# Patient Record
Sex: MALE | Race: BLACK OR AFRICAN AMERICAN | NOT HISPANIC OR LATINO | Employment: OTHER | URBAN - METROPOLITAN AREA
[De-identification: names, ages, dates, MRNs, and addresses within clinical notes are randomized per-mention and may not be internally consistent; named-entity substitution may affect disease eponyms.]

---

## 2024-02-25 ENCOUNTER — HOSPITAL ENCOUNTER (INPATIENT)
Facility: CLINIC | Age: 64
LOS: 2 days | Discharge: NURSING, REHAB OR LONG-TERM CARE | DRG: 066 | End: 2024-02-28
Attending: EMERGENCY MEDICINE | Admitting: HOSPITALIST
Payer: COMMERCIAL

## 2024-02-25 ENCOUNTER — APPOINTMENT (OUTPATIENT)
Dept: CT IMAGING | Facility: CLINIC | Age: 64
DRG: 066 | End: 2024-02-25
Attending: EMERGENCY MEDICINE
Payer: COMMERCIAL

## 2024-02-25 DIAGNOSIS — I10 HYPERTENSION, UNSPECIFIED TYPE: Primary | ICD-10-CM

## 2024-02-25 DIAGNOSIS — R06.83 SNORING: ICD-10-CM

## 2024-02-25 DIAGNOSIS — I63.9 CEREBROVASCULAR ACCIDENT (CVA), UNSPECIFIED MECHANISM (H): ICD-10-CM

## 2024-02-25 DIAGNOSIS — E11.9 TYPE 2 DIABETES MELLITUS WITHOUT COMPLICATION, UNSPECIFIED WHETHER LONG TERM INSULIN USE (H): ICD-10-CM

## 2024-02-25 DIAGNOSIS — I67.1 CEREBRAL ANEURYSM, NONRUPTURED: ICD-10-CM

## 2024-02-25 DIAGNOSIS — K59.00 CONSTIPATION, UNSPECIFIED CONSTIPATION TYPE: ICD-10-CM

## 2024-02-25 DIAGNOSIS — R29.90 STROKE-LIKE SYMPTOMS: ICD-10-CM

## 2024-02-25 PROBLEM — D69.6 THROMBOCYTOPENIA (H): Status: ACTIVE | Noted: 2024-02-25

## 2024-02-25 PROBLEM — I67.2 INTRACRANIAL ATHEROSCLEROSIS: Status: ACTIVE | Noted: 2024-02-25

## 2024-02-25 PROBLEM — D64.9 NORMOCYTIC ANEMIA: Status: ACTIVE | Noted: 2024-02-25

## 2024-02-25 PROBLEM — Z79.4 INSULIN DEPENDENT TYPE 2 DIABETES MELLITUS (H): Status: ACTIVE | Noted: 2024-02-25

## 2024-02-25 LAB
ANION GAP SERPL CALCULATED.3IONS-SCNC: 8 MMOL/L (ref 7–15)
APTT PPP: 23 SECONDS (ref 22–38)
ATRIAL RATE - MUSE: 88 BPM
BASOPHILS # BLD AUTO: 0 10E3/UL (ref 0–0.2)
BASOPHILS NFR BLD AUTO: 0 %
BUN SERPL-MCNC: 10.6 MG/DL (ref 8–23)
CALCIUM SERPL-MCNC: 10 MG/DL (ref 8.8–10.2)
CHLORIDE SERPL-SCNC: 105 MMOL/L (ref 98–107)
CHOLEST SERPL-MCNC: 198 MG/DL
CREAT SERPL-MCNC: 1.13 MG/DL (ref 0.67–1.17)
DEPRECATED HCO3 PLAS-SCNC: 25 MMOL/L (ref 22–29)
DIASTOLIC BLOOD PRESSURE - MUSE: 93 MMHG
EGFRCR SERPLBLD CKD-EPI 2021: 73 ML/MIN/1.73M2
EOSINOPHIL # BLD AUTO: 0.1 10E3/UL (ref 0–0.7)
EOSINOPHIL NFR BLD AUTO: 2 %
ERYTHROCYTE [DISTWIDTH] IN BLOOD BY AUTOMATED COUNT: 14 % (ref 10–15)
GLUCOSE BLDC GLUCOMTR-MCNC: 159 MG/DL (ref 70–99)
GLUCOSE BLDC GLUCOMTR-MCNC: 88 MG/DL (ref 70–99)
GLUCOSE SERPL-MCNC: 112 MG/DL (ref 70–99)
HBA1C MFR BLD: 7 %
HCT VFR BLD AUTO: 34.2 % (ref 40–53)
HDLC SERPL-MCNC: 55 MG/DL
HGB BLD-MCNC: 11.2 G/DL (ref 13.3–17.7)
IMM GRANULOCYTES # BLD: 0 10E3/UL
IMM GRANULOCYTES NFR BLD: 1 %
INR PPP: 1.08 (ref 0.85–1.15)
INTERPRETATION ECG - MUSE: NORMAL
LDLC SERPL CALC-MCNC: 124 MG/DL
LYMPHOCYTES # BLD AUTO: 1.2 10E3/UL (ref 0.8–5.3)
LYMPHOCYTES NFR BLD AUTO: 30 %
MCH RBC QN AUTO: 28.1 PG (ref 26.5–33)
MCHC RBC AUTO-ENTMCNC: 32.7 G/DL (ref 31.5–36.5)
MCV RBC AUTO: 86 FL (ref 78–100)
MONOCYTES # BLD AUTO: 0.6 10E3/UL (ref 0–1.3)
MONOCYTES NFR BLD AUTO: 14 %
NEUTROPHILS # BLD AUTO: 2.1 10E3/UL (ref 1.6–8.3)
NEUTROPHILS NFR BLD AUTO: 53 %
NONHDLC SERPL-MCNC: 143 MG/DL
NRBC # BLD AUTO: 0 10E3/UL
NRBC BLD AUTO-RTO: 0 /100
P AXIS - MUSE: 61 DEGREES
PLATELET # BLD AUTO: 144 10E3/UL (ref 150–450)
POTASSIUM SERPL-SCNC: 4.3 MMOL/L (ref 3.4–5.3)
PR INTERVAL - MUSE: 170 MS
QRS DURATION - MUSE: 86 MS
QT - MUSE: 372 MS
QTC - MUSE: 450 MS
R AXIS - MUSE: 10 DEGREES
RBC # BLD AUTO: 3.98 10E6/UL (ref 4.4–5.9)
SODIUM SERPL-SCNC: 138 MMOL/L (ref 135–145)
SYSTOLIC BLOOD PRESSURE - MUSE: 196 MMHG
T AXIS - MUSE: 34 DEGREES
TRIGL SERPL-MCNC: 97 MG/DL
TROPONIN T SERPL HS-MCNC: 11 NG/L
VENTRICULAR RATE- MUSE: 88 BPM
WBC # BLD AUTO: 4 10E3/UL (ref 4–11)

## 2024-02-25 PROCEDURE — 84484 ASSAY OF TROPONIN QUANT: CPT | Performed by: EMERGENCY MEDICINE

## 2024-02-25 PROCEDURE — 82962 GLUCOSE BLOOD TEST: CPT

## 2024-02-25 PROCEDURE — 250N000009 HC RX 250: Performed by: HOSPITALIST

## 2024-02-25 PROCEDURE — 70496 CT ANGIOGRAPHY HEAD: CPT | Mod: MG

## 2024-02-25 PROCEDURE — 80048 BASIC METABOLIC PNL TOTAL CA: CPT | Performed by: EMERGENCY MEDICINE

## 2024-02-25 PROCEDURE — G0378 HOSPITAL OBSERVATION PER HR: HCPCS

## 2024-02-25 PROCEDURE — 250N000013 HC RX MED GY IP 250 OP 250 PS 637: Performed by: EMERGENCY MEDICINE

## 2024-02-25 PROCEDURE — 93005 ELECTROCARDIOGRAM TRACING: CPT | Performed by: EMERGENCY MEDICINE

## 2024-02-25 PROCEDURE — 85610 PROTHROMBIN TIME: CPT | Performed by: EMERGENCY MEDICINE

## 2024-02-25 PROCEDURE — 250N000013 HC RX MED GY IP 250 OP 250 PS 637: Performed by: HOSPITALIST

## 2024-02-25 PROCEDURE — 99207 PR NO CHARGE LOS: CPT | Performed by: PHYSICIAN ASSISTANT

## 2024-02-25 PROCEDURE — 85730 THROMBOPLASTIN TIME PARTIAL: CPT | Performed by: EMERGENCY MEDICINE

## 2024-02-25 PROCEDURE — 250N000011 HC RX IP 250 OP 636: Performed by: EMERGENCY MEDICINE

## 2024-02-25 PROCEDURE — 99285 EMERGENCY DEPT VISIT HI MDM: CPT | Mod: 25

## 2024-02-25 PROCEDURE — 83036 HEMOGLOBIN GLYCOSYLATED A1C: CPT | Performed by: HOSPITALIST

## 2024-02-25 PROCEDURE — 99223 1ST HOSP IP/OBS HIGH 75: CPT | Performed by: HOSPITALIST

## 2024-02-25 PROCEDURE — 80061 LIPID PANEL: CPT | Performed by: HOSPITALIST

## 2024-02-25 PROCEDURE — 36415 COLL VENOUS BLD VENIPUNCTURE: CPT | Performed by: EMERGENCY MEDICINE

## 2024-02-25 PROCEDURE — 250N000012 HC RX MED GY IP 250 OP 636 PS 637: Performed by: HOSPITALIST

## 2024-02-25 PROCEDURE — 85025 COMPLETE CBC W/AUTO DIFF WBC: CPT | Performed by: EMERGENCY MEDICINE

## 2024-02-25 PROCEDURE — 70450 CT HEAD/BRAIN W/O DYE: CPT | Mod: MG

## 2024-02-25 RX ORDER — NETARSUDIL 0.2 MG/ML
1 SOLUTION/ DROPS OPHTHALMIC; TOPICAL AT BEDTIME
COMMUNITY

## 2024-02-25 RX ORDER — CLOPIDOGREL BISULFATE 75 MG/1
300 TABLET ORAL ONCE
Status: COMPLETED | OUTPATIENT
Start: 2024-02-25 | End: 2024-02-25

## 2024-02-25 RX ORDER — TRAVOPROST OPHTHALMIC SOLUTION 0.04 MG/ML
1 SOLUTION OPHTHALMIC AT BEDTIME
COMMUNITY

## 2024-02-25 RX ORDER — NICOTINE POLACRILEX 4 MG
15-30 LOZENGE BUCCAL
Status: DISCONTINUED | OUTPATIENT
Start: 2024-02-25 | End: 2024-02-28 | Stop reason: HOSPADM

## 2024-02-25 RX ORDER — BRIMONIDINE TARTRATE AND TIMOLOL MALEATE 2; 5 MG/ML; MG/ML
1 SOLUTION OPHTHALMIC 2 TIMES DAILY
Status: DISCONTINUED | OUTPATIENT
Start: 2024-02-25 | End: 2024-02-28 | Stop reason: HOSPADM

## 2024-02-25 RX ORDER — DORZOLAMIDE HCL 20 MG/ML
1 SOLUTION/ DROPS OPHTHALMIC 2 TIMES DAILY
Status: DISCONTINUED | OUTPATIENT
Start: 2024-02-25 | End: 2024-02-25

## 2024-02-25 RX ORDER — SIMVASTATIN 10 MG
10 TABLET ORAL AT BEDTIME
Status: ON HOLD | COMMUNITY
End: 2024-02-27

## 2024-02-25 RX ORDER — IOPAMIDOL 755 MG/ML
100 INJECTION, SOLUTION INTRAVASCULAR ONCE
Status: COMPLETED | OUTPATIENT
Start: 2024-02-25 | End: 2024-02-25

## 2024-02-25 RX ORDER — LABETALOL HYDROCHLORIDE 5 MG/ML
10-20 INJECTION, SOLUTION INTRAVENOUS EVERY 10 MIN PRN
Status: DISCONTINUED | OUTPATIENT
Start: 2024-02-25 | End: 2024-02-28 | Stop reason: HOSPADM

## 2024-02-25 RX ORDER — DORZOLAMIDE HCL 20 MG/ML
1 SOLUTION/ DROPS OPHTHALMIC 2 TIMES DAILY
Status: DISCONTINUED | OUTPATIENT
Start: 2024-02-25 | End: 2024-02-28 | Stop reason: HOSPADM

## 2024-02-25 RX ORDER — CLOPIDOGREL BISULFATE 75 MG/1
75 TABLET ORAL DAILY
Status: DISCONTINUED | OUTPATIENT
Start: 2024-02-26 | End: 2024-02-28 | Stop reason: HOSPADM

## 2024-02-25 RX ORDER — SIMVASTATIN 10 MG
10 TABLET ORAL AT BEDTIME
Status: DISCONTINUED | OUTPATIENT
Start: 2024-02-25 | End: 2024-02-26

## 2024-02-25 RX ORDER — DEXTROSE MONOHYDRATE 25 G/50ML
25-50 INJECTION, SOLUTION INTRAVENOUS
Status: DISCONTINUED | OUTPATIENT
Start: 2024-02-25 | End: 2024-02-28 | Stop reason: HOSPADM

## 2024-02-25 RX ORDER — INSULIN ASPART 100 [IU]/ML
INJECTION, SOLUTION INTRAVENOUS; SUBCUTANEOUS
Status: ON HOLD | COMMUNITY
End: 2024-02-28

## 2024-02-25 RX ORDER — BRIMONIDINE TARTRATE AND TIMOLOL MALEATE 2; 5 MG/ML; MG/ML
1 SOLUTION OPHTHALMIC 2 TIMES DAILY
COMMUNITY

## 2024-02-25 RX ORDER — ASPIRIN 325 MG
325 TABLET ORAL DAILY
Status: DISCONTINUED | OUTPATIENT
Start: 2024-02-25 | End: 2024-02-28 | Stop reason: HOSPADM

## 2024-02-25 RX ORDER — DORZOLAMIDE HCL 20 MG/ML
1 SOLUTION/ DROPS OPHTHALMIC 2 TIMES DAILY
COMMUNITY

## 2024-02-25 RX ORDER — TRAVOPROST OPHTHALMIC SOLUTION 0.04 MG/ML
1 SOLUTION OPHTHALMIC AT BEDTIME
Status: DISCONTINUED | OUTPATIENT
Start: 2024-02-25 | End: 2024-02-28 | Stop reason: HOSPADM

## 2024-02-25 RX ORDER — HYDRALAZINE HYDROCHLORIDE 20 MG/ML
10-20 INJECTION INTRAMUSCULAR; INTRAVENOUS
Status: DISCONTINUED | OUTPATIENT
Start: 2024-02-25 | End: 2024-02-28 | Stop reason: HOSPADM

## 2024-02-25 RX ORDER — LIDOCAINE 40 MG/G
CREAM TOPICAL
Status: DISCONTINUED | OUTPATIENT
Start: 2024-02-25 | End: 2024-02-28 | Stop reason: HOSPADM

## 2024-02-25 RX ADMIN — DORZOLAMIDE HYDROCHLORIDE 1 DROP: 20 SOLUTION/ DROPS OPHTHALMIC at 19:59

## 2024-02-25 RX ADMIN — CLOPIDOGREL BISULFATE 300 MG: 75 TABLET ORAL at 14:13

## 2024-02-25 RX ADMIN — BRIMONIDINE TARTRATE, TIMOLOL MALEATE 1 DROP: 2; 5 SOLUTION/ DROPS TOPICAL at 19:59

## 2024-02-25 RX ADMIN — IOPAMIDOL 75 ML: 755 INJECTION, SOLUTION INTRAVENOUS at 13:14

## 2024-02-25 RX ADMIN — TRAVOPROST OPHTHALMIC SOLUTION 1 DROP: 0.04 SOLUTION OPHTHALMIC at 21:09

## 2024-02-25 RX ADMIN — SIMVASTATIN 10 MG: 10 TABLET, FILM COATED ORAL at 21:16

## 2024-02-25 RX ADMIN — INSULIN GLARGINE 32 UNITS: 100 INJECTION, SOLUTION SUBCUTANEOUS at 21:15

## 2024-02-25 RX ADMIN — ASPIRIN 325 MG ORAL TABLET 325 MG: 325 PILL ORAL at 14:13

## 2024-02-25 ASSESSMENT — ACTIVITIES OF DAILY LIVING (ADL)
ADLS_ACUITY_SCORE: 35

## 2024-02-25 ASSESSMENT — ENCOUNTER SYMPTOMS
HEADACHES: 0
WEAKNESS: 1

## 2024-02-25 NOTE — ED PROVIDER NOTES
EMERGENCY DEPARTMENT ENCOUNTER      NAME: Donnie Trinidad  AGE: 63 year old male  YOB: 1960  MRN: [id number]  EVALUATION DATE & TIME: 2/25/2024  1:06 PM    PCP: No primary care provider on file.    ED PROVIDER: Johnnie Contreras MD    Chief Complaint   Patient presents with    Gait Problem    Facial Droop     FINAL IMPRESSION:  Stroke symptoms  MRI pending    ED COURSE & MEDICAL DECISION MAKING:    Pertinent Labs & Imaging studies reviewed. (See chart for details)  63 year old male presents to the Emergency Department for evaluation of right sided weakness developing yesterday afternoon.  Patient arrives via EMS.  Report via EMS and the patient is that he developed acute onset of right leg dysfunction right arm dysfunction starting sometime in the afternoon yesterday between 1 and 5 PM.  Persistent symptoms into today and subsequently contacted EMS and was transported to emergency department.  He reports that his right arm has seemingly recovered but he has persistent right leg dysfunction.  I initially assessed the patient in the EMS stretcher at arrival to the emergency department with considerations for stroke code activation.  He had weakness noted to the right leg when compared to the left side.  There was also a subtle facial droop involving the left side.  We proceeded with a tier 2 stroke code.  The patient is outside tPA window.  CT CTA performed and discussed with our stroke neurology team.  Concern for stroke mediated symptoms on CT scan.  I did discuss case also with neuroradiology did not see a large vessel occlusion or per neuroradiology evidence of acute infarct based on initial CT scan findings.  We are proceeding with MRI for additional assessment.  The clinical history and assessment is most consistent with acute stroke outside the window for intervention.  We have administered aspirin and Plavix and are awaiting MRI imaging for additional assessment.    1:04 PM   I met with the patient,  obtained history, performed an initial exam, and discussed options and plan for diagnostics and treatment here in the ED.    1:06 PM   I called a stroke code.    1:10 PM  I spoke on the phone with Jennifer Jorgensen PA-C, stroke neurology.    1:23 PM   I spoke on the phone with Jenniefr Jorgensen PA-C, stroke neurology, who has deescalated the stroke code.     At the conclusion of the encounter I discussed the results of all of the tests and the disposition. The questions were answered. The patient or family acknowledged understanding and was agreeable with the care plan.     Medical Decision Making  Obtained supplemental history:Supplemental history obtained?: No  Reviewed external records: External records reviewed?: No  Care impacted by chronic illness:Diabetes  Care significantly affected by social determinants of health:N/A  Did you consider but not order tests?: Work up considered but not performed and documented in chart, if applicable  Did you interpret images independently?: Independent interpretation of ECG and images noted in documentation, when applicable.  Consultation discussion with other provider:Did you involve another provider (consultant, MH, pharmacy, etc.)?: I discussed the care with another health care provider, see documentation for details.  Admission considered. Patient was signed out to the oncoming physician, disposition pending.      MEDICATIONS GIVEN IN THE EMERGENCY:  Medications   aspirin (ASA) tablet 325 mg (325 mg Oral $Given 2/25/24 1413)   iopamidol (ISOVUE-370) solution 100 mL (75 mLs Intravenous $Given 2/25/24 1314)   clopidogrel (PLAVIX) tablet 300 mg (300 mg Oral $Given 2/25/24 1413)       NEW PRESCRIPTIONS STARTED AT TODAY'S ER VISIT  New Prescriptions    No medications on file          =================================================================    HPI    Patient information was obtained from: Patient and EMS.     Use of : N/A         Donnie Trinidad is a 63 year old male  with a pertinent history of T2DM who presents to this ED via ambulance for evaluation of right leg weakness and left-sided facial droop.    Per EMS, the patient's family noted a neurological deficit in the patient starting yesterday afternoon, with inability to move his right leg and difficulty ambulating. Then, he experienced a near-syncopal episode today, when the patient's family found him in the bathroom with an altered mental state. Patient has a subtle left-sided facial droop with smiling and reports vision changes.     Per patient, he is unable to move his right leg, noting weakness while ambulating. He is not experiencing any weakness in his right arm. Patient is unsure of his symptom onset, noting he does not know if he was experiencing right-sided weakness when he woke up. Denies headache, chest pain, or any other concerns at this time. Patient notes that he takes lantis, aspirin, lupron, and eye drops.       REVIEW OF SYSTEMS   Review of Systems   Cardiovascular:  Negative for chest pain.   Neurological:  Positive for weakness. Negative for headaches.   All other systems reviewed and are negative.       PAST MEDICAL HISTORY:  No past medical history on file.    PAST SURGICAL HISTORY:  No past surgical history on file.        CURRENT MEDICATIONS:    No current outpatient medications on file.      ALLERGIES:  No Known Allergies    FAMILY HISTORY:  No family history on file.    SOCIAL HISTORY:        VITALS:  BP (!) 155/72   Pulse 80   Temp 98.4  F (36.9  C) (Oral)   Resp 20   SpO2 100%     PHYSICAL EXAM    PHYSICAL EXAM    Constitutional: Well developed, Well nourished, NAD  HENT: Normocephalic, Atraumatic, Bilateral external ears normal, Oropharynx normal, mucous membranes moist, Nose normal. Neck-  Normal range of motion, No tenderness, Supple, No stridor.   Eyes: PERRL, EOMI, Conjunctiva normal, No discharge.   Respiratory: Normal breath sounds, No respiratory distress, No wheezing, Speaks full  sentences easily. No cough.   Cardiovascular: Normal heart rate, Regular rhythm, No murmurs Chest wall nontender.    GI:  Soft, No tenderness, No masses, No flank tenderness. No rebound or guarding.  : No cva tenderness    Musculoskeletal: 2+ DP pulses. No edema. No cyanosis. Good range of motion in all major joints. No tenderness to palpation. No tenderness of the CTLS spine.   Integument: Warm, Dry, No erythema, No rash. No petechiae.   Neurologic: Alert & oriented x 3, subtle facial droop involving left side.  Some weakness to the right lower leg compared to the left leg.  We did not ambulate the patient and she was reporting unsteadiness.  Psychiatric: Affect normal, Judgment normal, Mood normal. Cooperative.     LAB:  All pertinent labs reviewed and interpreted.  Results for orders placed or performed during the hospital encounter of 02/25/24   CT Head w/o Contrast    Impression    CONCLUSION:   HEAD CT:  1.  Subtle thickening of the interhemispheric falx, which is likely chronic. However, in the absence of prior comparison studies, a trace parafalcine subdural is difficult to exclude. Attention to this is recommended on follow-up studies.  2.  No evidence for acute vascular territorial infarction. Consider MRI for further evaluation, as clinically appropriate.  3.  Multiple chronic-appearing infarctions as described above.  4.  Age-related change.    HEAD CTA:   1.  No evidence of proximal large vessel occlusion.  2.  Moderate left anterior M2 segment stenosis.  3.  Moderate proximal right posterior M2 segment stenosis.  4.  Multifocal moderate and moderate-to-severe A2 and A3 segment stenoses.  5.  Diffuse irregularity and mild-to-moderate narrowing of distal branches, compatible with intracranial atherosclerosis.  6.  Multiple small ICA aneurysm suspected bilaterally, as above.    NECK CTA:  1.  Bilateral ICA stenosis of less than 50% based on NASCET criteria.  2.  No findings specific for  dissection.    Results discussed with Johnnie Contreras on 2/25/2024 1:34 PM CST.    CTA Head Neck with Contrast    Impression    CONCLUSION:   HEAD CT:  1.  Subtle thickening of the interhemispheric falx, which is likely chronic. However, in the absence of prior comparison studies, a trace parafalcine subdural is difficult to exclude. Attention to this is recommended on follow-up studies.  2.  No evidence for acute vascular territorial infarction. Consider MRI for further evaluation, as clinically appropriate.  3.  Multiple chronic-appearing infarctions as described above.  4.  Age-related change.    HEAD CTA:   1.  No evidence of proximal large vessel occlusion.  2.  Moderate left anterior M2 segment stenosis.  3.  Moderate proximal right posterior M2 segment stenosis.  4.  Multifocal moderate and moderate-to-severe A2 and A3 segment stenoses.  5.  Diffuse irregularity and mild-to-moderate narrowing of distal branches, compatible with intracranial atherosclerosis.  6.  Multiple small ICA aneurysm suspected bilaterally, as above.    NECK CTA:  1.  Bilateral ICA stenosis of less than 50% based on NASCET criteria.  2.  No findings specific for dissection.    Results discussed with Johnnie Contreras on 2/25/2024 1:34 PM CST.    Basic metabolic panel   Result Value Ref Range    Sodium 138 135 - 145 mmol/L    Potassium 4.3 3.4 - 5.3 mmol/L    Chloride 105 98 - 107 mmol/L    Carbon Dioxide (CO2) 25 22 - 29 mmol/L    Anion Gap 8 7 - 15 mmol/L    Urea Nitrogen 10.6 8.0 - 23.0 mg/dL    Creatinine 1.13 0.67 - 1.17 mg/dL    GFR Estimate 73 >60 mL/min/1.73m2    Calcium 10.0 8.8 - 10.2 mg/dL    Glucose 112 (H) 70 - 99 mg/dL   Result Value Ref Range    INR 1.08 0.85 - 1.15   Partial thromboplastin time   Result Value Ref Range    aPTT 23 22 - 38 Seconds   Result Value Ref Range    Troponin T, High Sensitivity 11 <=22 ng/L   CBC with platelets and differential   Result Value Ref Range    WBC Count 4.0 4.0 - 11.0 10e3/uL    RBC  Count 3.98 (L) 4.40 - 5.90 10e6/uL    Hemoglobin 11.2 (L) 13.3 - 17.7 g/dL    Hematocrit 34.2 (L) 40.0 - 53.0 %    MCV 86 78 - 100 fL    MCH 28.1 26.5 - 33.0 pg    MCHC 32.7 31.5 - 36.5 g/dL    RDW 14.0 10.0 - 15.0 %    Platelet Count 144 (L) 150 - 450 10e3/uL    % Neutrophils 53 %    % Lymphocytes 30 %    % Monocytes 14 %    % Eosinophils 2 %    % Basophils 0 %    % Immature Granulocytes 1 %    NRBCs per 100 WBC 0 <1 /100    Absolute Neutrophils 2.1 1.6 - 8.3 10e3/uL    Absolute Lymphocytes 1.2 0.8 - 5.3 10e3/uL    Absolute Monocytes 0.6 0.0 - 1.3 10e3/uL    Absolute Eosinophils 0.1 0.0 - 0.7 10e3/uL    Absolute Basophils 0.0 0.0 - 0.2 10e3/uL    Absolute Immature Granulocytes 0.0 <=0.4 10e3/uL    Absolute NRBCs 0.0 10e3/uL   Glucose by meter   Result Value Ref Range    GLUCOSE BY METER POCT 88 70 - 99 mg/dL   ECG 12-LEAD WITH MUSE (LHE)   Result Value Ref Range    Systolic Blood Pressure 196 mmHg    Diastolic Blood Pressure 93 mmHg    Ventricular Rate 88 BPM    Atrial Rate 88 BPM    RI Interval 170 ms    QRS Duration 86 ms     ms    QTc 450 ms    P Axis 61 degrees    R AXIS 10 degrees    T Axis 34 degrees    Interpretation ECG       Sinus rhythm  Normal ECG  No previous ECGs available  Confirmed by SEE ED PROVIDER NOTE FOR, ECG INTERPRETATION ([id number]),  Sheyla Sanchez ([id number]) on 2/25/2024 1:36:43 PM         RADIOLOGY:  Reviewed all pertinent imaging. Please see official radiology report.  CTA Head Neck with Contrast   Final Result   CONCLUSION:    HEAD CT:   1.  Subtle thickening of the interhemispheric falx, which is likely chronic. However, in the absence of prior comparison studies, a trace parafalcine subdural is difficult to exclude. Attention to this is recommended on follow-up studies.   2.  No evidence for acute vascular territorial infarction. Consider MRI for further evaluation, as clinically appropriate.   3.  Multiple chronic-appearing infarctions as described above.   4.  Age-related change.       HEAD CTA:    1.  No evidence of proximal large vessel occlusion.   2.  Moderate left anterior M2 segment stenosis.   3.  Moderate proximal right posterior M2 segment stenosis.   4.  Multifocal moderate and moderate-to-severe A2 and A3 segment stenoses.   5.  Diffuse irregularity and mild-to-moderate narrowing of distal branches, compatible with intracranial atherosclerosis.   6.  Multiple small ICA aneurysm suspected bilaterally, as above.      NECK CTA:   1.  Bilateral ICA stenosis of less than 50% based on NASCET criteria.   2.  No findings specific for dissection.      Results discussed with Johnnie Contreras on 2/25/2024 1:34 PM CST.       CT Head w/o Contrast   Final Result   CONCLUSION:    HEAD CT:   1.  Subtle thickening of the interhemispheric falx, which is likely chronic. However, in the absence of prior comparison studies, a trace parafalcine subdural is difficult to exclude. Attention to this is recommended on follow-up studies.   2.  No evidence for acute vascular territorial infarction. Consider MRI for further evaluation, as clinically appropriate.   3.  Multiple chronic-appearing infarctions as described above.   4.  Age-related change.      HEAD CTA:    1.  No evidence of proximal large vessel occlusion.   2.  Moderate left anterior M2 segment stenosis.   3.  Moderate proximal right posterior M2 segment stenosis.   4.  Multifocal moderate and moderate-to-severe A2 and A3 segment stenoses.   5.  Diffuse irregularity and mild-to-moderate narrowing of distal branches, compatible with intracranial atherosclerosis.   6.  Multiple small ICA aneurysm suspected bilaterally, as above.      NECK CTA:   1.  Bilateral ICA stenosis of less than 50% based on NASCET criteria.   2.  No findings specific for dissection.      Results discussed with Johnnie Contreras on 2/25/2024 1:34 PM CST.       CT Head Perfusion w Contrast - For Tier 2 Stroke    (Results Pending)   MR Brain w/o & w Contrast    (Results  Pending)       EKG:    Performed at: 25-FEB-2024 13:25    Impression: Sinus rhythm. Normal ECG. No previous ECGs available.    Rate: 88 BPM  Rhythm: Sinus rhythm   Axis: 61 10 34  NM Interval: 170 ms  QRS Interval: 86 ms  QTc Interval: 372/450 ms  Comparison: No previous ECGs    I have independently reviewed and interpreted the EKG(s) documented above.    I, Afshan Vinod, am serving as a scribe to document services personally performed by Johnnie Contreras based on my observation and the provider's statements to me. I, Johnnie Contreras MD, attest that Afshan Vinod is acting in a scribe capacity, has observed my performance of the services and has documented them in accordance with my direction.    Johnnie Contreras MD  [facility] EMERGENCY ROOM  [facility] [facility] [id number]  [phone]       Johnnie Contreras MD  02/25/24 [id number]

## 2024-02-25 NOTE — PHARMACY-ADMISSION MEDICATION HISTORY
Pharmacist Admission Medication History    Admission medication history is complete. The information provided in this note is only as accurate as the sources available at the time of the update.    Information Source(s): Patient, Prescription bottles, and CareEverywhere/SureScripts via in-person    Pertinent Information:      Changes made to PTA medication list:  Added: All  Deleted: None  Changed: None    Allergies reviewed with patient and updates made in EHR: yes    Medication History Completed By: Car Solis [facility] 2/25/2024 5:36 PM    PTA Med List   Medication Sig Last Dose    brimonidine-timolol (COMBIGAN) 0.2-0.5 % ophthalmic solution Place 1 drop into both eyes 2 times daily 2/24/2024 at pm - not with, pt out of    dorzolamide (TRUSOPT) 2 % ophthalmic solution Place 1 drop into both eyes 2 times daily 2/24/2024 at pm - has with    enzalutamide (XTANDI) 40 MG capsule Take 160 mg by mouth daily 2/24/2024 at am - has with    insulin aspart (NOVOLOG FLEXPEN) 100 UNIT/ML pen Inject Subcutaneous 3 times daily (with meals) Sliding Scale 2/24/2024 at pm    insulin glargine (LANTUS PEN) 100 UNIT/ML pen Inject 32 Units Subcutaneous at bedtime 2/24/2024 at pm    linagliptin-metFORMIN (JENTADUETO) 2.5-1000 MG per tablet Take 1 tablet by mouth 2 times daily (with meals) 2/24/2024 at pm - has with    netarsudil (RHOPRESSA) 0.02 % ophthalmic solution Place 1 drop into both eyes at bedtime 2/24/2024 at pm - has with    simvastatin (ZOCOR) 10 MG tablet Take 10 mg by mouth at bedtime 2/24/2024 at pm    travoprost CHAY FREE (TRAVATAN Z) 0.004 % ophthalmic solution Place 1 drop into both eyes at bedtime 2/24/2024 at pm - has with

## 2024-02-25 NOTE — CONSULTS
"  [facility]    Stroke Telephone Note    I was called by Johnnie Contreras on 02/25/24 regarding patient Donnie Trinidad. The patient is a 63 year old male who presnts with around 20-24 hours worth of right leg weakness and mild left facial droop. Per what his wife told EMS it started yesterday afternoon but they are not sure exactly when. He slept on it last night hoping it would resolve but it persisted today    Vitals  BP: (!) 196/93   Pulse: 85   Resp: 18   Temp: 98.4  F (36.9  C)        Stroke Code Data (for stroke code without tele)  Stroke code activated 02/25/24  1307   Stroke provider first response 02/25/24  1308   Last known normal 02/24/24  1300      Time of discovery (or onset of symptoms) 02/24/24  1500   Head CT read by Stroke Neuro Provider 02/25/24  1315   Was stroke code de-escalated? Yes  02/25/24  1325     Imaging Findings  CT head: suspected acute lacunar infarct on the left, ?basal ganglia/lentiform nucleus  CTA head/neck: Stenosis of the R ICA and R MCA as well as multiple other areas of intracranial stenosis bilaterally    Intravenous Thrombolysis  Not given due to:   - established infarct on imaging  - unclear or unfavorable risk-benefit profile for extended window thrombolysis beyond the conventional 4.5 hour time window    Endovascular Treatment  Not initiated due to absence of proximal vessel occlusion    Impression  Suspected lacunar acute ischemic stroke on the left    Recommendations   - Use orderset: \"Ischemic Stroke Routine Admission\" or \"Ischemic Stroke No Thrombolytics/No Thrombectomy ICU Admission\"  - Neurochecks and Vital Signs every 4 hours   - Permissive HTN; goal SBP < 220 mmHg  - Daily aspirin 325 mg for secondary stroke prevention  - Plavix (clopidogrel) 300 mg PO loading dose x 1  - Plavix (clopidogrel) 75 mg PO Daily  - Statin: high dose, treat to goal LDL 40-70  - MRI Brain with and without contrast  - TTE (with Bubble Study if age 60 yrs or less)  - " "Telemetry, EKG  - Bedside Glucose Monitoring  - A1c, Lipid Panel, Troponin x 3  - PT/OT/SLP  - Stroke Education  - Euthermia, Euglycemia    Case discussed with vascular neurology attending Dr. Oliveira.    My recommendations are based on the information provided over the phone by Donnie Trinidad's in-person providers. They are not intended to replace the clinical judgment of his in-person providers. I was not requested to personally see or examine the patient at this time.     Jennifer Jorgensen PA-C  Vascular Neurology    To page me or covering stroke neurology team member, click here: [email or web address]  Choose \"On Call\" tab at top, then select \"NEUROLOGY/ALL SITES\" from middle drop-down box, press Enter, then look for \"stroke\" or \"telestroke\" for your site.    "

## 2024-02-25 NOTE — ED NOTES
ED SIGNOUT  Date/Time:2/25/2024 2:46 PM    ED Course/MDM:    2:46 PM  Signout accepted from Dr. Contreras.  Prior records were reviewed.  Labs, x-ray, CT, EKG from this visit are reviewed.  Briefly, 63-year-old male who presented for evaluation of right leg weakness and left-sided facial droop noted yesterday afternoon.  CT/CTA does not demonstrate any acute hemorrhage, diffuse narrowing of multiple segments.  MRI is pending at this time.  4:15 PM discussed with MRI, patient has bilateral eye implants related to glaucoma, unsure what these are and unable to contact patient's eye clinic to discuss.  Patient will be admitted for continued stroke workup and MRI in the morning if implants are compatible.  4:50 PM care discussed with Dr. Swan for admission.    At the conclusion of the encounter I discussed  the results of all of the tests and the disposition with patient.   All questions were answered.  The patient acknowledged understanding and was involved in the decision making regarding the overall care plan.     I discussed with patient the utility, limitations and findings of the exam/interventions/studies done during this visit as well as the list of differential diagnosis and symptoms to monitor/return to ER for.  Additional verbal discharge instructions were provided.     DIAGNOSIS  1. Stroke-like symptoms        New Prescriptions    No medications on file       HPI    Briefly, this is a 63 year old male who presents to this ED for evaluation of right leg weakness and left-sided facial droop.     The patient's family noted that yesterday afternoon, the patient had an inability to move his right leg and difficulty ambulating. Today, he experienced a near-syncopal episode, and was found in the bathroom with an altered mental state.     Per patient, he is unable to move his right leg, noting weakness while ambulating. He is not experiencing any weakness in his right arm. Patient is unsure of his symptom  onset.    Physical Exam:  BP (!) 190/91   Pulse 69   Temp 98.4  F (36.9  C) (Oral)   Resp 21   Wt 77.8 kg (171 lb 7 oz)   SpO2 100%   Physical Exam  Vitals and nursing note reviewed.   Constitutional:       Appearance: Normal appearance.   HENT:      Head: Normocephalic and atraumatic.      Right Ear: External ear normal.      Left Ear: External ear normal.      Nose: Nose normal.   Eyes:      Extraocular Movements: Extraocular movements intact.      Conjunctiva/sclera: Conjunctivae normal.      Pupils: Pupils are equal, round, and reactive to light.   Pulmonary:      Effort: Pulmonary effort is normal.   Musculoskeletal:         General: No swelling or deformity. Normal range of motion.      Cervical back: Normal range of motion.   Neurological:      Mental Status: He is alert and oriented to person, place, and time.      Comments: Subtle left facial droop and slight right leg weakness   Psychiatric:         Mood and Affect: Mood normal.         Behavior: Behavior normal.         Thought Content: Thought content normal.          Results for orders placed or performed during the hospital encounter of 02/25/24   CT Head w/o Contrast    Impression    CONCLUSION:   HEAD CT:  1.  Subtle thickening of the interhemispheric falx, which is likely chronic. However, in the absence of prior comparison studies, a trace parafalcine subdural is difficult to exclude. Attention to this is recommended on follow-up studies.  2.  No evidence for acute vascular territorial infarction. Consider MRI for further evaluation, as clinically appropriate.  3.  Multiple chronic-appearing infarctions as described above.  4.  Age-related change.    HEAD CTA:   1.  No evidence of proximal large vessel occlusion.  2.  Moderate left anterior M2 segment stenosis.  3.  Moderate proximal right posterior M2 segment stenosis.  4.  Multifocal moderate and moderate-to-severe A2 and A3 segment stenoses.  5.  Diffuse irregularity and mild-to-moderate  narrowing of distal branches, compatible with intracranial atherosclerosis.  6.  Multiple small ICA aneurysm suspected bilaterally, as above.    NECK CTA:  1.  Bilateral ICA stenosis of less than 50% based on NASCET criteria.  2.  No findings specific for dissection.    Results discussed with Johnnie Contreras on 2/25/2024 1:34 PM CST.    CTA Head Neck with Contrast    Impression    CONCLUSION:   HEAD CT:  1.  Subtle thickening of the interhemispheric falx, which is likely chronic. However, in the absence of prior comparison studies, a trace parafalcine subdural is difficult to exclude. Attention to this is recommended on follow-up studies.  2.  No evidence for acute vascular territorial infarction. Consider MRI for further evaluation, as clinically appropriate.  3.  Multiple chronic-appearing infarctions as described above.  4.  Age-related change.    HEAD CTA:   1.  No evidence of proximal large vessel occlusion.  2.  Moderate left anterior M2 segment stenosis.  3.  Moderate proximal right posterior M2 segment stenosis.  4.  Multifocal moderate and moderate-to-severe A2 and A3 segment stenoses.  5.  Diffuse irregularity and mild-to-moderate narrowing of distal branches, compatible with intracranial atherosclerosis.  6.  Multiple small ICA aneurysm suspected bilaterally, as above.    NECK CTA:  1.  Bilateral ICA stenosis of less than 50% based on NASCET criteria.  2.  No findings specific for dissection.    Results discussed with Johnnie Contreras on 2/25/2024 1:34 PM CST.    Basic metabolic panel   Result Value Ref Range    Sodium 138 135 - 145 mmol/L    Potassium 4.3 3.4 - 5.3 mmol/L    Chloride 105 98 - 107 mmol/L    Carbon Dioxide (CO2) 25 22 - 29 mmol/L    Anion Gap 8 7 - 15 mmol/L    Urea Nitrogen 10.6 8.0 - 23.0 mg/dL    Creatinine 1.13 0.67 - 1.17 mg/dL    GFR Estimate 73 >60 mL/min/1.73m2    Calcium 10.0 8.8 - 10.2 mg/dL    Glucose 112 (H) 70 - 99 mg/dL   Result Value Ref Range    INR 1.08 0.85 - 1.15    Partial thromboplastin time   Result Value Ref Range    aPTT 23 22 - 38 Seconds   Result Value Ref Range    Troponin T, High Sensitivity 11 <=22 ng/L   CBC with platelets and differential   Result Value Ref Range    WBC Count 4.0 4.0 - 11.0 10e3/uL    RBC Count 3.98 (L) 4.40 - 5.90 10e6/uL    Hemoglobin 11.2 (L) 13.3 - 17.7 g/dL    Hematocrit 34.2 (L) 40.0 - 53.0 %    MCV 86 78 - 100 fL    MCH 28.1 26.5 - 33.0 pg    MCHC 32.7 31.5 - 36.5 g/dL    RDW 14.0 10.0 - 15.0 %    Platelet Count 144 (L) 150 - 450 10e3/uL    % Neutrophils 53 %    % Lymphocytes 30 %    % Monocytes 14 %    % Eosinophils 2 %    % Basophils 0 %    % Immature Granulocytes 1 %    NRBCs per 100 WBC 0 <1 /100    Absolute Neutrophils 2.1 1.6 - 8.3 10e3/uL    Absolute Lymphocytes 1.2 0.8 - 5.3 10e3/uL    Absolute Monocytes 0.6 0.0 - 1.3 10e3/uL    Absolute Eosinophils 0.1 0.0 - 0.7 10e3/uL    Absolute Basophils 0.0 0.0 - 0.2 10e3/uL    Absolute Immature Granulocytes 0.0 <=0.4 10e3/uL    Absolute NRBCs 0.0 10e3/uL   Glucose by meter   Result Value Ref Range    GLUCOSE BY METER POCT 88 70 - 99 mg/dL   ECG 12-LEAD WITH MUSE (LHE)   Result Value Ref Range    Systolic Blood Pressure 196 mmHg    Diastolic Blood Pressure 93 mmHg    Ventricular Rate 88 BPM    Atrial Rate 88 BPM    OK Interval 170 ms    QRS Duration 86 ms     ms    QTc 450 ms    P Axis 61 degrees    R AXIS 10 degrees    T Axis 34 degrees    Interpretation ECG       Sinus rhythm  Normal ECG  No previous ECGs available  Confirmed by SEE ED PROVIDER NOTE FOR, ECG INTERPRETATION ([id number]),  Sheyla Sanchez ([id number]) on 2/25/2024 1:36:43 PM         CT Head w/o Contrast    Result Date: 2/25/2024  [facility] 1. CT HEAD W/O CONTRAST 2. CTA HEAD NECK W CONTRAST 2/25/2024 1:12 PM CST INDICATION: Facial droop, gait abnormality. TECHNIQUE: Head and neck CT angiogram with IV contrast. Noncontrast head CT followed by axial helical CT images of the head and neck vessels obtained  during the arterial phase of intravenous contrast administration. Axial helical 2D reconstructed images and multiplanar 3D MIP reconstructed images of the head and neck vessels were performed by the technologist. Dose reduction techniques were used. CONTRAST: 1. None. 2. 75 mL Isovue-370. COMPARISON: None. FINDINGS: NONCONTRAST HEAD CT: INTRACRANIAL CONTENTS: Subtle thickening along the falx is favored to represent chronic dural thickening (image 22 of series 5, for example). However, in the absence of prior comparison studies, a trace 2 mm subdural hematoma is difficult to entirely exclude. No mass effect.  No CT evidence of acute infarct. Chronic-appearing infarctions of the left basal ganglia left frontal periventricular white matter/callosal genu, and thalami. There is mild scattered white matter hypoattenuation, which is nonspecific. This commonly reflects chronic small vessel ischemic change. Diffuse and proportionate prominence of the ventricles, sulci and cisterns is compatible with moderate generalized parenchymal atrophy. The sella is unremarkable for technique. The  cerebellar tonsils are appropriately positioned. VISUALIZED ORBITS/SINUSES/MASTOIDS: Prior bilateral cataract surgery. Visualized portions of the orbits are otherwise unremarkable. Presumed bilateral glaucoma drainage devices. Please correlate with the operative history. No middle ear or mastoid effusion. BONES/SOFT TISSUES: No scalp hematoma. No skull fracture. HEAD CTA: ANTERIOR CIRCULATION: The intracranial internal carotid arteries demonstrate multifocal mild stenosis. The anterior cerebral arteries demonstrate multifocal moderate and moderate-to-severe A2 and A3 segment stenosis bilaterally. There is a moderate left proximal anterior M2 segment stenosis. There is moderate proximal posterior right M2 segment stenosis. There is mild proximal right M1 segment stenosis. Questionable medially directed 2 mm clinoidal ICA aneurysm (image 298 of  series 6. 1.5 mm right posterior communicating artery infundibulum or aneurysm. Laterally directed contour irregularity of the left distal cavernous ICA (image 302 of series 6) may represent atherosclerotic plaque or a 1 mm aneurysm. POSTERIOR CIRCULATION: The intradural vertebral, basilar and visualized proximal cerebellar arteries appear patent. The right posterior cerebral artery demonstrates mild-to-moderate multifocal P2 segment stenosis. The left posterior cerebral artery is unremarkable. No aneurysm or high flow vascular malformation is demonstrated. DURAL VENOUS SINUSES: Expected enhancement of the major dural venous sinuses. NECK CTA: RIGHT CAROTID: Atherosclerotic plaque contributes to right ICA stenosis of less than 50% based on NASCET criteria. The ICA is tortuous. LEFT CAROTID: Atherosclerotic plaque contributes to left ICA stenosis of less than 50% based on NASCET criteria. VERTEBRAL ARTERIES: Dominant left and smaller right vertebral arteries are patent in the neck and into the head. AORTIC ARCH: There is a left-sided aortic arch. No flow-limiting stenosis is apparent at the origins of the brachiocephalic, common carotid, subclavian or vertebral arteries. MISCELLANEOUS: The visualized lung apices are well aerated. The soft tissues of the neck, including the thyroid and parotid glands, are grossly unremarkable for technique. Mild cervical spondylosis.     CONCLUSION: HEAD CT: 1.  Subtle thickening of the interhemispheric falx, which is likely chronic. However, in the absence of prior comparison studies, a trace parafalcine subdural is difficult to exclude. Attention to this is recommended on follow-up studies. 2.  No evidence for acute vascular territorial infarction. Consider MRI for further evaluation, as clinically appropriate. 3.  Multiple chronic-appearing infarctions as described above. 4.  Age-related change. HEAD CTA: 1.  No evidence of proximal large vessel occlusion. 2.  Moderate left  anterior M2 segment stenosis. 3.  Moderate proximal right posterior M2 segment stenosis. 4.  Multifocal moderate and moderate-to-severe A2 and A3 segment stenoses. 5.  Diffuse irregularity and mild-to-moderate narrowing of distal branches, compatible with intracranial atherosclerosis. 6.  Multiple small ICA aneurysm suspected bilaterally, as above. NECK CTA: 1.  Bilateral ICA stenosis of less than 50% based on NASCET criteria. 2.  No findings specific for dissection. Results discussed with Johnnie Contreras on 2/25/2024 1:34 PM CST.     CTA Head Neck with Contrast    Result Date: 2/25/2024  [facility] 1. CT HEAD W/O CONTRAST 2. CTA HEAD NECK W CONTRAST 2/25/2024 1:12 PM CST INDICATION: Facial droop, gait abnormality. TECHNIQUE: Head and neck CT angiogram with IV contrast. Noncontrast head CT followed by axial helical CT images of the head and neck vessels obtained during the arterial phase of intravenous contrast administration. Axial helical 2D reconstructed images and multiplanar 3D MIP reconstructed images of the head and neck vessels were performed by the technologist. Dose reduction techniques were used. CONTRAST: 1. None. 2. 75 mL Isovue-370. COMPARISON: None. FINDINGS: NONCONTRAST HEAD CT: INTRACRANIAL CONTENTS: Subtle thickening along the falx is favored to represent chronic dural thickening (image 22 of series 5, for example). However, in the absence of prior comparison studies, a trace 2 mm subdural hematoma is difficult to entirely exclude. No mass effect.  No CT evidence of acute infarct. Chronic-appearing infarctions of the left basal ganglia left frontal periventricular white matter/callosal genu, and thalami. There is mild scattered white matter hypoattenuation, which is nonspecific. This commonly reflects chronic small vessel ischemic change. Diffuse and proportionate prominence of the ventricles, sulci and cisterns is compatible with moderate generalized parenchymal  atrophy. The sella is unremarkable for technique. The  cerebellar tonsils are appropriately positioned. VISUALIZED ORBITS/SINUSES/MASTOIDS: Prior bilateral cataract surgery. Visualized portions of the orbits are otherwise unremarkable. Presumed bilateral glaucoma drainage devices. Please correlate with the operative history. No middle ear or mastoid effusion. BONES/SOFT TISSUES: No scalp hematoma. No skull fracture. HEAD CTA: ANTERIOR CIRCULATION: The intracranial internal carotid arteries demonstrate multifocal mild stenosis. The anterior cerebral arteries demonstrate multifocal moderate and moderate-to-severe A2 and A3 segment stenosis bilaterally. There is a moderate left proximal anterior M2 segment stenosis. There is moderate proximal posterior right M2 segment stenosis. There is mild proximal right M1 segment stenosis. Questionable medially directed 2 mm clinoidal ICA aneurysm (image 298 of series 6. 1.5 mm right posterior communicating artery infundibulum or aneurysm. Laterally directed contour irregularity of the left distal cavernous ICA (image 302 of series 6) may represent atherosclerotic plaque or a 1 mm aneurysm. POSTERIOR CIRCULATION: The intradural vertebral, basilar and visualized proximal cerebellar arteries appear patent. The right posterior cerebral artery demonstrates mild-to-moderate multifocal P2 segment stenosis. The left posterior cerebral artery is unremarkable. No aneurysm or high flow vascular malformation is demonstrated. DURAL VENOUS SINUSES: Expected enhancement of the major dural venous sinuses. NECK CTA: RIGHT CAROTID: Atherosclerotic plaque contributes to right ICA stenosis of less than 50% based on NASCET criteria. The ICA is tortuous. LEFT CAROTID: Atherosclerotic plaque contributes to left ICA stenosis of less than 50% based on NASCET criteria. VERTEBRAL ARTERIES: Dominant left and smaller right vertebral arteries are patent in the neck and into the head. AORTIC ARCH: There is a  left-sided aortic arch. No flow-limiting stenosis is apparent at the origins of the brachiocephalic, common carotid, subclavian or vertebral arteries. MISCELLANEOUS: The visualized lung apices are well aerated. The soft tissues of the neck, including the thyroid and parotid glands, are grossly unremarkable for technique. Mild cervical spondylosis.     CONCLUSION: HEAD CT: 1.  Subtle thickening of the interhemispheric falx, which is likely chronic. However, in the absence of prior comparison studies, a trace parafalcine subdural is difficult to exclude. Attention to this is recommended on follow-up studies. 2.  No evidence for acute vascular territorial infarction. Consider MRI for further evaluation, as clinically appropriate. 3.  Multiple chronic-appearing infarctions as described above. 4.  Age-related change. HEAD CTA: 1.  No evidence of proximal large vessel occlusion. 2.  Moderate left anterior M2 segment stenosis. 3.  Moderate proximal right posterior M2 segment stenosis. 4.  Multifocal moderate and moderate-to-severe A2 and A3 segment stenoses. 5.  Diffuse irregularity and mild-to-moderate narrowing of distal branches, compatible with intracranial atherosclerosis. 6.  Multiple small ICA aneurysm suspected bilaterally, as above. NECK CTA: 1.  Bilateral ICA stenosis of less than 50% based on NASCET criteria. 2.  No findings specific for dissection. Results discussed with Johnnie Contreras on 2/25/2024 1:34 PM CST.       Kirk Monsivais M.D.  [facility] Emergency Department     Kirk Monsivais MD  02/25/24 [id number]

## 2024-02-25 NOTE — ED TRIAGE NOTES
The patient presents to the ED via EMS from home with concerns for gait changes and a facial droop since yesterday. Patient assessed by provider upon arrival and tier 2 stroke code called. Transported to CT on EMS stretcher.     Triage Assessment (Adult)       Row Name 02/25/24 1309          Triage Assessment    Airway WDL WDL        Respiratory WDL    Respiratory WDL WDL        Skin Circulation/Temperature WDL    Skin Circulation/Temperature WDL WDL        Cardiac WDL    Cardiac WDL WDL        Peripheral/Neurovascular WDL    Peripheral Neurovascular WDL WDL        Cognitive/Neuro/Behavioral WDL    Cognitive/Neuro/Behavioral WDL X        [place] Coma Scale    Best Eye Response 4-->(E4) spontaneous     Best Motor Response 6-->(M6) obeys commands     Best Verbal Response 5-->(V5) oriented     [place] Coma Scale Score 15     Assessment Qualifiers patient not sedated/intubated

## 2024-02-26 ENCOUNTER — APPOINTMENT (OUTPATIENT)
Dept: CARDIOLOGY | Facility: CLINIC | Age: 64
DRG: 066 | End: 2024-02-26
Attending: INTERNAL MEDICINE
Payer: COMMERCIAL

## 2024-02-26 ENCOUNTER — APPOINTMENT (OUTPATIENT)
Dept: PHYSICAL THERAPY | Facility: CLINIC | Age: 64
DRG: 066 | End: 2024-02-26
Attending: HOSPITALIST
Payer: COMMERCIAL

## 2024-02-26 ENCOUNTER — APPOINTMENT (OUTPATIENT)
Dept: OCCUPATIONAL THERAPY | Facility: CLINIC | Age: 64
DRG: 066 | End: 2024-02-26
Attending: HOSPITALIST
Payer: COMMERCIAL

## 2024-02-26 PROBLEM — I10 HYPERTENSION, UNSPECIFIED TYPE: Status: ACTIVE | Noted: 2024-02-26

## 2024-02-26 LAB
ANION GAP SERPL CALCULATED.3IONS-SCNC: 11 MMOL/L (ref 7–15)
BUN SERPL-MCNC: 12.8 MG/DL (ref 8–23)
CALCIUM SERPL-MCNC: 10.1 MG/DL (ref 8.8–10.2)
CHLORIDE SERPL-SCNC: 105 MMOL/L (ref 98–107)
CREAT SERPL-MCNC: 0.97 MG/DL (ref 0.67–1.17)
DEPRECATED HCO3 PLAS-SCNC: 23 MMOL/L (ref 22–29)
EGFRCR SERPLBLD CKD-EPI 2021: 87 ML/MIN/1.73M2
ERYTHROCYTE [DISTWIDTH] IN BLOOD BY AUTOMATED COUNT: 13.9 % (ref 10–15)
GLUCOSE BLDC GLUCOMTR-MCNC: 101 MG/DL (ref 70–99)
GLUCOSE BLDC GLUCOMTR-MCNC: 135 MG/DL (ref 70–99)
GLUCOSE BLDC GLUCOMTR-MCNC: 137 MG/DL (ref 70–99)
GLUCOSE BLDC GLUCOMTR-MCNC: 90 MG/DL (ref 70–99)
GLUCOSE SERPL-MCNC: 106 MG/DL (ref 70–99)
HCT VFR BLD AUTO: 33.1 % (ref 40–53)
HGB BLD-MCNC: 10.8 G/DL (ref 13.3–17.7)
LVEF ECHO: NORMAL
MCH RBC QN AUTO: 28 PG (ref 26.5–33)
MCHC RBC AUTO-ENTMCNC: 32.6 G/DL (ref 31.5–36.5)
MCV RBC AUTO: 86 FL (ref 78–100)
PLATELET # BLD AUTO: 163 10E3/UL (ref 150–450)
POTASSIUM SERPL-SCNC: 3.8 MMOL/L (ref 3.4–5.3)
RBC # BLD AUTO: 3.86 10E6/UL (ref 4.4–5.9)
SODIUM SERPL-SCNC: 139 MMOL/L (ref 135–145)
WBC # BLD AUTO: 4 10E3/UL (ref 4–11)

## 2024-02-26 PROCEDURE — 82962 GLUCOSE BLOOD TEST: CPT

## 2024-02-26 PROCEDURE — 250N000013 HC RX MED GY IP 250 OP 250 PS 637: Performed by: HOSPITALIST

## 2024-02-26 PROCEDURE — 250N000013 HC RX MED GY IP 250 OP 250 PS 637: Performed by: EMERGENCY MEDICINE

## 2024-02-26 PROCEDURE — 97166 OT EVAL MOD COMPLEX 45 MIN: CPT | Mod: GO

## 2024-02-26 PROCEDURE — 93306 TTE W/DOPPLER COMPLETE: CPT | Mod: 26 | Performed by: INTERNAL MEDICINE

## 2024-02-26 PROCEDURE — 97116 GAIT TRAINING THERAPY: CPT | Mod: GP

## 2024-02-26 PROCEDURE — 85027 COMPLETE CBC AUTOMATED: CPT | Performed by: HOSPITALIST

## 2024-02-26 PROCEDURE — 97161 PT EVAL LOW COMPLEX 20 MIN: CPT | Mod: GP

## 2024-02-26 PROCEDURE — G0378 HOSPITAL OBSERVATION PER HR: HCPCS

## 2024-02-26 PROCEDURE — 255N000002 HC RX 255 OP 636: Performed by: INTERNAL MEDICINE

## 2024-02-26 PROCEDURE — 80048 BASIC METABOLIC PNL TOTAL CA: CPT | Performed by: HOSPITALIST

## 2024-02-26 PROCEDURE — 97535 SELF CARE MNGMENT TRAINING: CPT | Mod: GO

## 2024-02-26 PROCEDURE — 99232 SBSQ HOSP IP/OBS MODERATE 35: CPT | Performed by: INTERNAL MEDICINE

## 2024-02-26 PROCEDURE — 36415 COLL VENOUS BLD VENIPUNCTURE: CPT | Performed by: HOSPITALIST

## 2024-02-26 PROCEDURE — C8929 TTE W OR WO FOL WCON,DOPPLER: HCPCS

## 2024-02-26 PROCEDURE — 210N000002 HC R&B HEART CARE

## 2024-02-26 PROCEDURE — G0427 INPT/ED TELECONSULT70: HCPCS | Performed by: PHYSICIAN ASSISTANT

## 2024-02-26 PROCEDURE — 250N000013 HC RX MED GY IP 250 OP 250 PS 637: Performed by: INTERNAL MEDICINE

## 2024-02-26 RX ORDER — ATORVASTATIN CALCIUM 40 MG/1
40 TABLET, FILM COATED ORAL EVERY EVENING
Status: DISCONTINUED | OUTPATIENT
Start: 2024-02-26 | End: 2024-02-28 | Stop reason: HOSPADM

## 2024-02-26 RX ADMIN — DORZOLAMIDE HYDROCHLORIDE 1 DROP: 20 SOLUTION/ DROPS OPHTHALMIC at 19:42

## 2024-02-26 RX ADMIN — INSULIN GLARGINE 32 UNITS: 100 INJECTION, SOLUTION SUBCUTANEOUS at 21:07

## 2024-02-26 RX ADMIN — ATORVASTATIN CALCIUM 40 MG: 40 TABLET, FILM COATED ORAL at 19:43

## 2024-02-26 RX ADMIN — CLOPIDOGREL BISULFATE 75 MG: 75 TABLET ORAL at 09:11

## 2024-02-26 RX ADMIN — PERFLUTREN 3 ML: 6.52 INJECTION, SUSPENSION INTRAVENOUS at 13:40

## 2024-02-26 RX ADMIN — TRAVOPROST OPHTHALMIC SOLUTION 1 DROP: 0.04 SOLUTION OPHTHALMIC at 21:04

## 2024-02-26 RX ADMIN — ASPIRIN 325 MG ORAL TABLET 325 MG: 325 PILL ORAL at 09:12

## 2024-02-26 ASSESSMENT — ACTIVITIES OF DAILY LIVING (ADL)
ADLS_ACUITY_SCORE: 35
ADLS_ACUITY_SCORE: 44
ADLS_ACUITY_SCORE: 35
ADLS_ACUITY_SCORE: 44
ADLS_ACUITY_SCORE: 43
ADLS_ACUITY_SCORE: 35
ADLS_ACUITY_SCORE: 44
ADLS_ACUITY_SCORE: 44
ADLS_ACUITY_SCORE: 41
ADLS_ACUITY_SCORE: 44
ADLS_ACUITY_SCORE: 33
ADLS_ACUITY_SCORE: 33
TOILETING_ISSUES: NO
ADLS_ACUITY_SCORE: 43
ADLS_ACUITY_SCORE: 41
ADLS_ACUITY_SCORE: 44
ADLS_ACUITY_SCORE: 44
ADLS_ACUITY_SCORE: 33
ADLS_ACUITY_SCORE: 41
ADLS_ACUITY_SCORE: 44
ADLS_ACUITY_SCORE: 44
ADLS_ACUITY_SCORE: 35
ADLS_ACUITY_SCORE: 41
ADLS_ACUITY_SCORE: 35

## 2024-02-26 NOTE — PLAN OF CARE
"PRIMARY DIAGNOSIS: \"GENERIC\" NURSING  OUTPATIENT/OBSERVATION GOALS TO BE MET BEFORE DISCHARGE:  ADLs back to baseline: No Right leg has weakness which is making ambulation slow    Activity and level of assistance: Up with standby assistance.    Pain status: Pain free.    Return to near baseline physical activity: Yes     Discharge Planner Nurse   Safe discharge environment identified: Yes  Barriers to discharge: Yes MRI and Echo today       Entered by: Roshni Marshall RN 02/26/2024 1:58 PM     Please review provider order for any additional goals.   Nurse to notify provider when observation goals have been met and patient is ready for discharge.                        "

## 2024-02-26 NOTE — PROGRESS NOTES
"   02/26/24 0831   Appointment Info   Signing Clinician's Name / Credentials (PT) Sandy Dent, PT, DPT   Quick Adds   Quick Adds Certification   Living Environment   People in Home   (Nephew and his wife - son is a PCA and assists once in awhile)   Current Living Arrangements house   Home Accessibility stairs to enter home;stairs within home   Number of Stairs, Main Entrance 2   Stair Railings, Main Entrance none   Number of Stairs, Within Home, Primary eight   Stair Railings, Within Home, Primary railing on right side (ascending)   Living Environment Comments nephew helps with mobility/stairs. pt reports nephew & wife able to assist when not at work.   Self-Care   Equipment Currently Used at Home none   Fall history within last six months no   Activity/Exercise/Self-Care Comment pt usually does not use any ADs but recently using FWW   General Information   Onset of Illness/Injury or Date of Surgery 02/25/24   Referring Physician Nirmal Swan MD   Patient/Family Therapy Goals Statement (PT) Return to Home   Pertinent History of Current Problem (include personal factors and/or comorbidities that impact the POC) Per Chart Review - \" 63 year old male  PMHx:  Chief Complaint: Right lower extremity weakness and left sided facial drooping\"   Existing Precautions/Restrictions fall   Strength (Manual Muscle Testing)   Strength (Manual Muscle Testing) Deficits observed during functional mobility   Transfers   Transfers sit-stand transfer   Sit-Stand Transfer   Sit-Stand [place] (Transfers) supervision;verbal cues;minimum assist (75% patient effort)   Assistive Device (Sit-Stand Transfers) walker, front-wheeled   Gait/Stairs (Locomotion)   [place] Level (Gait) supervision;verbal cues;minimum assist (75% patient effort)   Assistive Device (Gait) walker, front-wheeled   Pattern (Gait) step-to   Deviations/Abnormal Patterns (Gait) gait speed decreased;jay decreased   Clinical Impression   Criteria for " "Skilled Therapeutic Intervention Yes, treatment indicated   PT Diagnosis (PT) Impaired Functional Mobility   Influenced by the following impairments weakness, impaired balance   Functional limitations due to impairments gait, transfers, stairs   Clinical Presentation (PT Evaluation Complexity) stable   Clinical Presentation Rationale pt presents as medically diagnosed   Clinical Decision Making (Complexity) low complexity   Planned Therapy Interventions (PT) balance training;gait training;home exercise program;stair training;strengthening;transfer training;ROM (range of motion)   Risk & Benefits of therapy have been explained evaluation/treatment results reviewed;patient   PT Total Evaluation Time   PT Eval, Low Complexity Minutes (84316) 10   Therapy Certification   Start of care date 02/26/24   Certification date from 02/26/24   Certification date to 03/27/24   Medical Diagnosis Per Chart Review - \" 63 year old male PMHx: Chief Complaint: Right lower extremity weakness and left sided facial drooping\"   Physical Therapy Goals   PT Frequency Daily   PT Predicted Duration/Target Date for Goal Attainment 03/05/24   PT Goals Transfers;Gait;Stairs   PT: Transfers Supervision/stand-by assist;Sit to/from stand;Assistive device   PT: Gait Supervision/stand-by assist;Rolling walker;Greater than 200 feet   PT: Stairs Supervision/stand-by assist;8 stairs;Rail on right   Interventions   Interventions Quick Adds Gait Training;Therapeutic Activity   Therapeutic Activity   Therapeutic Activities: dynamic activities to improve functional performance Minutes (69708) 3   Symptoms Noted During/After Treatment Fatigue   Treatment Detail/Skilled Intervention Sit to/from stand: cueing for safety/technique/hand placement on stable surface, CGA to [place] when fatigued; step up on scale: cueing for safety/use of Bilateral UE support, Min A for balance   Gait Training   Gait Training Minutes (62415) 23   Symptoms Noted During/After Treatment " (Gait Training) fatigue   Treatment Detail/Skilled Intervention cueing for safety/technique/keeping FWW close & on ground for turns - significant decreased gait speed noted during session, intermittent Min A when FWW lifted off ground during turns. pt relied on FWW for added stability; STAIRS: ascend/descend 8 stairs with use of bilateral railings, cueing for safety/technique/non reciprocol step pattern, Increased knee buckling noted with descending stairs requiring Min to Mod A   Distance in Feet 150   [place] Level (Gait Training)   (CGA to Min A)   Physical Assistance Level (Gait Training) supervision;verbal cues   Weight Bearing (Gait Training) full weight-bearing   Assistive Device (Gait Training) rolling walker   Pattern Analysis (Gait Training) swing-through gait   Gait Analysis Deviations decreased jay;decreased step length   Impairments (Gait Analysis/Training) balance impaired;strength decreased   Stair Railings present at both sides   Physical Assist/Nonphysical Assist (Stairs) supervision;verbal cues   Level of [place] (Stairs)   (Min to Mod A during second set d/t fatigue & knee buckling)   Assistive Device (Stairs) other (see comments)  (none)   PT Discharge Planning   PT Plan gait, transfers, stairs   PT Discharge Recommendation (DC Rec) Transitional Care Facility   PT Rationale for DC Rec pt requries increased assist for functional mobility. Increased knee buckling noted with stairs and bilateral railings. TCU for improving activity tolerance/strength/balance   PT Brief overview of current status CGA to Min A for transfers & gait 150' with FWW; Min A to Mod A for 8 stairs d/t knee buckling   PT Equipment Needed at Discharge walker, rolling   Total Session Time   Timed Code Treatment Minutes 26   Total Session Time (sum of timed and untimed services) 36     [facility]  OUTPATIENT PHYSICAL THERAPY EVALUATION  PLAN OF TREATMENT FOR OUTPATIENT  "REHABILITATION  (COMPLETE FOR INITIAL CLAIMS ONLY)  Patient's Last Name, First Name, M.I.  YOB: 1960  Donnie Trinidad                        Provider's Name  [facility] Medical Record No.  [id number]                             Onset Date:  02/25/24   Start of Care Date:  02/26/24   Type:     _X_PT   ___OT   ___SLP Medical Diagnosis:  Per Chart Review - \" 63 year old male PMHx: Chief Complaint: Right lower extremity weakness and left sided facial drooping\"              PT Diagnosis:  Impaired Functional Mobility Visits from SOC:  1     See note for plan of treatment, functional goals and certification details    I CERTIFY THE NEED FOR THESE SERVICES FURNISHED UNDER        THIS PLAN OF TREATMENT AND WHILE UNDER MY CARE     (Physician co-signature of this document indicates review and certification of the therapy plan).              " Is This A New Presentation, Or A Follow-Up?: Skin Lesion What Type Of Note Output Would You Prefer (Optional)?: Standard Output How Severe Is Your Skin Lesion?: mild Has Your Skin Lesion Been Treated?: not been treated

## 2024-02-26 NOTE — CONSULTS
Care Management Initial Consult      General Information  Assessment completed with: Patient, Spouse or significant other,    Type of CM/SW Visit: Initial Assessment  Primary Care Provider verified and updated as needed: Yes   Readmission within the last 30 days: no previous admission in last 30 days   Reason for Consult: discharge planning, health care directive, transportation  Advance Care Planning: Advance Care Planning Reviewed: no concerns identified        Communication Assessment  Patient's communication style: spoken language (English or Bilingual)        Cognitive  Cognitive/Neuro/Behavioral: WDL    Level of Consciousness: alert    Arousal Level: opens eyes spontaneously    Orientation: oriented x 4    Mood/Behavior: behavior appropriate to situation    Best Language: 0 - No aphasia    Speech: clear, spontaneous    Living Environment:   People in home: spouse, other relative(s), child(maggie), adult (Wife, son, and a nephew)     Current living Arrangements: house      Able to return to prior arrangements: other (see comments) (TBD further)     Family/Social Support:  Care provided by: self, child(maggie), spouse/significant other, other (see comments) (Family in the home assist pt with I/ADL as needed)  Provides care for: no one, unable/limited ability to care for self  Marital Status:   Wife, Children, Other (specify) (A nephew as well)  Patricia       Description of Support System: Supportive, Involved (As needed/able)    Support Assessment: Adequate family and caregiver support, Adequate social supports (When at baseline)    Current Resources:   Patient receiving home care services: No  Community Resources: None  Equipment currently used at home:    Supplies currently used at home: None    Employment/Financial:  Employment Status: retired     Financial Concerns: none   Referral to Financial Worker: No     Does the patient's insurance plan have a 3 day qualifying hospital stay waiver?  No    Lifestyle &  "Psychosocial Needs:  Social Determinants of Health     Food Insecurity: Not on file   Depression: Not on file   Housing Stability: Not on file   Tobacco Use: Not on file   Financial Resource Strain: Not on file   Alcohol Use: Not on file   Transportation Needs: Not on file   Physical Activity: Not on file   Interpersonal Safety: Not on file   Stress: Not on file   Social Connections: Not on file     Functional Status:  Prior to admission patient needed assistance:   Dependent ADLs:: Independent  Dependent IADLs:: Transportation, Shopping, Cleaning, Meal Preparation  Assessment of Functional Status: Not at  functional baseline    Mental Health Status:  Mental Health Status: No Current Concerns       Chemical Dependency Status:  Chemical Dependency Status: No Current Concerns           Values/Beliefs:  Spiritual, Cultural Beliefs, Oriental orthodox Practices, Values that affect care: no (None identified)             Additional Information:  Writer met with pt and his wife to introduce Care Management(CM), obtain an initial assessment, provide APARICIO info, and offer discharge support. Pt resides in a house with his wife and a nephew. Family assists with I/ADL as needed/listed above. No identified DME. No current in-home services. No concerns expressed in regard to eventual return home. Open to receiving in-home support as indicated. Pt/family was provided with the Medicare Compare list for SNF and Home Care.  Discussed associated Medicare star ratings to assist with choice for referrals/discharge planning Yes. Education was given to pt/family that star ratings are updated/maintained by Medicare and can be reviewed by visiting www.medicare.gov Yes - list declined in hopes of returning home without the need.    02/25 per , S.-\"63 year old male presents to the Emergency Department for evaluation of right sided weakness developing yesterday afternoon.  Patient arrives via EMS.  Report via EMS and the patient is that he " "developed acute onset of right leg dysfunction right arm dysfunction starting sometime in the afternoon yesterday between 1 and 5 PM.  Persistent symptoms into today and subsequently contacted EMS and was transported to emergency department.  He reports that his right arm has seemingly recovered but he has persistent right leg dysfunction.  I initially assessed the patient in the EMS stretcher at arrival to the emergency department with considerations for stroke code activation.  He had weakness noted to the right leg when compared to the left side.  There was also a subtle facial droop involving the left side.  We proceeded with a tier 2 stroke code.  The patient is outside tPA window.  CT CTA performed and discussed with our stroke neurology team.  Concern for stroke mediated symptoms on CT scan.  I did discuss case also with neuroradiology did not see a large vessel occlusion or per neuroradiology evidence of acute infarct based on initial CT scan findings.  We are proceeding with MRI for additional assessment.  The clinical history and assessment is most consistent with acute stroke outside the window for intervention.  We have administered aspirin and Plavix and are awaiting MRI imaging for additional assessment.\"    No current pending consults. Anticipate improvement and return to home at time of discharge. CM to follow for possible service coordination needs. Family to transport.    Demetrius Diaz RN      "

## 2024-02-26 NOTE — PROGRESS NOTES
02/26/24 0730   Appointment Info   Signing Clinician's Name / Credentials (OT) Carloz Horner OTR/L   Quick Adds   Quick Adds Certification   Living Environment   People in Home other (see comments)  (Nephew; Pt's wife does not live with him.)   Current Living Arrangements house  (2 levels)   Living Environment Comments [place] to stay on one floor.  Tub/shower, std toilet  no GB   Self-Care   Usual Activity Tolerance good   Current Activity Tolerance moderate   Instrumental Activities of Daily Living (IADL)   IADL Comments Nephew cares for all IADLs   General Information   Onset of Illness/Injury or Date of Surgery 02/25/24   Referring Physician Dr. Michael Salgado   Patient/Family Therapy Goal Statement (OT) To return home with my nephew   Additional Occupational Profile Info/Pertinent History of Current Problem Adm with CVA.  PMH:  IDDM, Normocytic anemia, thrombocytopenia.   Existing Precautions/Restrictions (S)  fall   Cognitive Status Examination   Orientation Status person;time  (No oriented to hospital name)   Follows Commands WFL   Range of Motion Comprehensive   General Range of Motion no range of motion deficits identified   Strength Comprehensive (MMT)   General Manual Muscle Testing (MMT) Assessment no strength deficits identified   Bed Mobility   Bed Mobility supine-sit   Supine-Sit [place] (Bed Mobility) supervision   Assistive Device (Bed Mobility) bed rails   Transfers   Transfers sit-stand transfer;toilet transfer;bed-chair transfer   Transfer Comments use of FWW   Transfer Skill: Bed to Chair/Chair to Bed   Bed-Chair [place] (Transfers) contact guard   Assistive Device (Bed-Chair Transfers) rolling walker   Sit-Stand Transfer   Sit-Stand [place] (Transfers) contact guard   Assistive Device (Sit-Stand Transfers) walker, front-wheeled   Toilet Transfer   Type (Toilet Transfer) sit-stand;stand-sit   [place] Level (Toilet Transfer) contact guard   Assistive Device (Toilet  Transfer) walker, front-wheeled   Balance   Balance Assessment standing static balance   Standing Balance: Static supervision   Position/Device Used, Standing Balance walker, front-wheeled   Activities of Daily Living   BADL Assessment/Intervention lower body dressing   Lower Body Dressing Assessment/Training   Position (Lower Body Dressing) supported sitting   [place] Level (Lower Body Dressing) lower body dressing skills;doff;don;socks;contact guard assist   Clinical Impression   Criteria for Skilled Therapeutic Interventions Met (OT) Yes, treatment indicated   OT Diagnosis decreased indep with ADLs and trsfs   OT Problem List-Impairments impacting ADL cognition;mobility   Assessment of Occupational Performance 3-5 Performance Deficits   Identified Performance Deficits dressing, toileting, bathing, trsfs, Cognition   Planned Therapy Interventions (OT) ADL retraining;cognition   Clinical Decision Making Complexity (OT) detailed assessment/moderate complexity   Risk & Benefits of therapy have been explained evaluation/treatment results reviewed;participants included;patient   OT Total Evaluation Time   OT Eval, Moderate Complexity Minutes (17021) 15   Therapy Certification   Medical Diagnosis CVA   Start of Care Date 02/26/24   Certification date from 02/26/24   Certification date to 03/26/24   OT Goals   Therapy Frequency (OT) 5 times/week   OT Predicted Duration/Target Date for Goal Attainment 03/01/24   OT Goals Hygiene/Grooming;Lower Body Dressing;Toilet Transfer/Toileting;Cognition   OT: Hygiene/Grooming modified independent;while standing   OT: Lower Body Dressing Modified independent   OT: Toilet Transfer/Toileting Independent   OT: Cognitive Patient/caregiver will verbalize understanding of cognitive assessment results/recommendations as needed for safe discharge planning  (with VC as needed)   Interventions   Interventions Quick Adds Self-Care/Home Management   Self-Care/Home Management    Self-Care/Home Mgmt/ADL, Compensatory, Meal Prep Minutes (95860) 30   Symptoms Noted During/After Treatment (Meal Preparation/Planning Training) increased pain  (IV site of the L U/E)   Treatment Detail/Skilled Intervention Pt in bed when therapist arrived.  Pt slow to respond.  Extra time needed for processing.  CGA with bed mobility and trsfs using the FWW.  VC for safe hand placement.  CGA with toileting and G.H. cares standing at the sink.  Extra time needed for trsfs.  Pt reports weakness in the R L/E.  At end of session, Pt in the chair with call light in reach and chair alarm on.   OT Discharge Planning   OT Plan Work on Standing G/H, FB dressing, Trsfs with walker - watch for R L/E weakness.  SLUMS.   OT Discharge Recommendation (DC Rec) (S)  home with assist   OT Rationale for DC Rec Assist at home from nephew and Pt reports his son will be able to check on him as well.   OT Brief overview of current status CGA with trsfs using FWW.  Oriented to self and date but not to place.  Indep with don and doffing of socks in sitting.  Will need further cog assessment.   Total Session Time   Timed Code Treatment Minutes 30   Total Session Time (sum of timed and untimed services) 45    [facility]  OUTPATIENT OCCUPATIONAL THERAPY  EVALUATION  PLAN OF TREATMENT FOR OUTPATIENT REHABILITATION  (COMPLETE FOR INITIAL CLAIMS ONLY)  Patient's Last Name, First Name, M.I.  YOB: 1960  Donnie Trinidad                          Provider's Name  [facility] Medical Record No.  [id number]                             Onset Date:  02/25/24   Start of Care Date:  02/26/24   Type:     ___PT   _X_OT   ___SLP Medical Diagnosis:  CVA                    OT Diagnosis:  decreased indep with ADLs and trsfs Visits from SOC:  1     See note for plan of treatment, functional goals and certification details    I CERTIFY THE NEED FOR THESE SERVICES FURNISHED UNDER         THIS PLAN OF TREATMENT AND WHILE UNDER MY CARE     (Physician co-signature of this document indicates review and certification of the therapy plan).

## 2024-02-26 NOTE — CONSULTS
[facility]    Stroke Consult Note    Reason for Consult: Stroke    Chief Complaint: Gait Problem and Facial Droop       HPI  Donnie Trinidad is a 63 year old male with pertinent past medical history of DM2, HLD, prostate cancer, bilateral glaucoma with eye implants, on PTA simvastatin 10 mg daily. Says was taking ASA 81 mg daily PTA.    He presented to [facility] emergency department 2/25/2024 due to right leg weakness with difficulty ambulating, near syncopal episode, AMS, left-sided facial droop.  BP up to 207/88 in ED.  CT/CTA with scattered intracranial stenoses, notable for moderate bilateral M2 stenosis and moderate-severe A2/3 stenoses.  Started on DAPT with aspirin and Plavix and admitted for suspected stroke workup.    He was seen today via telemedicine video exam for inpatient consult.  Reports that yesterday he was sitting in the kitchen and then try to get up and noticed that his right foot was walking differently, dragging behind.  He need to hold onto the chair and his surroundings in order to stay upright.  He went to the bathroom following that and while he was sitting down on the commode he remembers that he thought he is going to fall so he can get up.  His wife reports that she tried helping him and suddenly his eyes rolled backwards, she stopped him to wake him up which she did appear to do.  She then called for the nephew to help her stand him up and they called 911.  Denied that he had any shaking/tremors, no tongue biting.  Patient does not seem to fully recall this event.  Tells me that when his right leg symptoms started he also noted right eye vision became worse.  He denies that he had any associated speech changes    Today he feels that his right face/arm/leg are numb and he still has right foot weakness requiring use of a walker this a.m.  His right eye vision is still much worse than his baseline.  Does not feel that his entire right leg is weak but mostly  the right foot, somewhat incoordinated.    His wife reports that she was concerned Saturday night that he was gasping for air and frequent coughing during the night.  Over the recent weeks they have transitioned his bed to the floor because he will frequently jump up during sleep and concerned for falls.    Family denies noticing any facial droop yesterday but still slight L facial droop on this provider's exam today.     MRI brain reportedly is able be done despite glaucoma implants.    -Smoking screen: no  -Alcohol screen: wine occasionally  -Drug screen: no  -Sleep Apnea screen:  concern of gasping breathing issues at night, recommend follow-up with sleep medicine clinic to evaluate for sleep apnea  -B symptom screen: no fevers, sometimes night sweats, 74 kg march 2023, now 70 kg, says up to date with cancer screenings     Stroke Evaluation Summarized    MRI/Head CT MRI brain: Pending  CT head: Subtle thickening interhemispheric falx, likely chronic; however in the absence of prior comparison studies trace parafalcine subdural is difficult to exclude, attention to this is recommended on follow-up studies, no evidence of acute infarct, multiple chronic appearing infarcts   Intracranial Vasculature CTA head: Moderate L anterior M2 stenosis, moderate proximal R posterior M2 stenosis, multifocal moderate and moderate-severe A2/3 stenoses, diffuse irregularity with mild-moderate stenosis distal branches, multiple small ICA aneurysm suspected bilaterally   Cervical Vasculature CTA neck: Bilateral ICA stenosis less than 50%     Echocardiogram TTE: LV normal size, proximal septal thickening noted, EF 55-60%, no WMA, RV normal, normal bilateral atria size, bubble study negative, mild aortic sclerosis, SR   EKG/Telemetry Normal sinus rhythm   Other Testing Not Applicable      LDL  2/25/2024: 124 mg/dL   A1C  2/25/2024: 7.0 %   Troponin 2/25/2024: 11 ng/L       Impression  R leg weakness (new and persistent), L facial droop  "(family is not sure if new), in setting of hypertensive emergency on arrival, MRI pending, does have b/l M2, and A2/3 stenoses so query potential vasovagal hypotensive episode leading to hypoperfusion in setting of ICAD    Multiple small bilateral ICA aneurysms    Recommendations   Acute Stroke Management:  -Neuro checks and vitals every 4 hours  - Inpatient SBP goal <180   -MRI brain pending  -If MRI positive for stroke that appears embolic then will consider CT CAP Given weight loss  - mg daily indefinitely  -Plavix 75 mg daily x 90 days  -stop simvastatin, start Lipitor 40 mg daily  -telemetry, 30 day CardioNet monitoring at discharge if no Afib found on telemetry (ordered)  -PT/OT/SPT, Dysphagia screen  -Euthermia, euglycemia, eunatremia  -Stroke Education  -Stroke Class per [facility])    Secondary stroke prevention:  -follow-up with PCP for titration to goal LDL 40-70, <40 increases risk of Intracranial hemorrhage  -Mediterranean diet can be beneficial for overall decreased cardiovascular risk, please print hand out for patient at discharge   -goal HgbA1c <7% for secondary stroke prevention, follow-up with PCP  -long term outpatient blood pressure goal <130/80 to be slowly achieved over the next several weeks, recommend home monitoring twice daily in AM and PM, keep log and bring to PCP follow-up    Discussed with vascular neurology attending, Dr. Alarcon      Patient Follow-up    - final recommendation pending work-up   -follow-up with neuroendovascular team for bilateral ICA aneurysms (ordered)  -Follow-up with sleep medicine clinic to evaluate for sleep apnea (ordered)    Thank you for this consult. We will continue to follow.     Janice Sepulveda PA-C  Vascular Neurology    To page me or covering stroke neurology team member, click here: AMCOM  Choose \"On Call\" tab at top, then select \"NEUROLOGY/ALL SITES\" from middle drop-down box, press Enter, then look for \"stroke\" or " "\"telestroke\" for your site.  _____________________________________________________    Clinically Significant Risk Factors Present on Admission                      # DMII: A1C = 7.0 % (Ref range: <5.7 %) within past 6 months        # Financial/Environmental Concerns: none           Past Medical History    No past medical history on file.  Medications   Home Meds  Prior to Admission medications    Medication Sig Start Date End Date Taking? Authorizing Provider   brimonidine-timolol (COMBIGAN) 0.2-0.5 % ophthalmic solution Place 1 drop into both eyes 2 times daily   Yes Unknown, Entered By History   dorzolamide (TRUSOPT) 2 % ophthalmic solution Place 1 drop into both eyes 2 times daily   Yes Unknown, Entered By History   enzalutamide (XTANDI) 40 MG capsule Take 160 mg by mouth daily   Yes Unknown, Entered By History   insulin aspart (NOVOLOG FLEXPEN) 100 UNIT/ML pen Inject Subcutaneous 3 times daily (with meals) Sliding Scale   Yes Unknown, Entered By History   insulin glargine (LANTUS PEN) 100 UNIT/ML pen Inject 32 Units Subcutaneous at bedtime   Yes Unknown, Entered By History   linagliptin-metFORMIN (JENTADUETO) 2.5-1000 MG per tablet Take 1 tablet by mouth 2 times daily (with meals)   Yes Unknown, Entered By History   netarsudil (RHOPRESSA) 0.02 % ophthalmic solution Place 1 drop into both eyes at bedtime   Yes Unknown, Entered By History   simvastatin (ZOCOR) 10 MG tablet Take 10 mg by mouth at bedtime   Yes Unknown, Entered By History   travoprost CHAY FREE (TRAVATAN Z) 0.004 % ophthalmic solution Place 1 drop into both eyes at bedtime   Yes Unknown, Entered By History       Scheduled Meds   aspirin  325 mg Oral Daily    atorvastatin  40 mg Oral QPM    brimonidine-timolol  1 drop Both Eyes BID    clopidogrel  75 mg Oral Daily    dorzolamide  1 drop Both Eyes BID    enzalutamide  160 mg Oral Daily    insulin aspart  1-7 Units Subcutaneous TID AC    insulin aspart  1-5 Units Subcutaneous At Bedtime    insulin " glargine  32 Units Subcutaneous At Bedtime    [Held by provider] linagliptin-metFORMIN  1 tablet Oral BID w/meals    netarsudil  1 drop Both Eyes At Bedtime    sodium chloride (PF)  3 mL Intracatheter Q8H    travoprost CHAY FREE  1 drop Both Eyes At Bedtime       Infusion Meds   - MEDICATION INSTRUCTIONS -      - MEDICATION INSTRUCTIONS -         Allergies   No Known Allergies       PHYSICAL EXAMINATION   Temp:  [98.2  F (36.8  C)-98.4  F (36.9  C)] 98.3  F (36.8  C)  Pulse:  [57-90] 57  Resp:  [17-40] 18  BP: (136-207)/(68-95) 159/84  SpO2:  [98 %-100 %] 100 %    General Exam  General:  patient lying in bed without any acute distress    HEENT:  normocephalic/atraumatic  Pulmonary:  no respiratory distress      Neuro Exam  Mental Status:  alert, oriented x 3, follows commands, speech clear with some mild decreased fluency which appears more due to difficulty with vision while looking at pictures is able to complete full sentences and carry on conversation otherwise  Cranial Nerves: Acute on chronic vision deficits not able to see much out of the right eye at all, blurred vision on the left (tested by nurse), EOMI with normal smooth pursuit, facial sensation intact and symmetric (tested by nurse), mild left facial droop, unclear chronicity, hearing not formally tested but intact to conversation, no dysarthria, tongue protrusion midline  Motor:  no abnormal movements, able to move all limbs antigravity spontaneously with no signs of hemiparesis observed, no pronator drift subjective feeling of weakness, to the right foot still compared to the left, no drift appreciated however  Reflexes:  unable to test (telestroke)  Sensory:  light touch sensation decreased to the right face/arm/leg compared to the left (assessed by nurse), no extinction on double simultaneous stimulation (assessed by nurse)  Coordination:  normal finger-to-nose and heel-to-shin bilaterally without dysmetria  Station/Gait:  unable to test due to  telestroke    Stroke Scales    NIHSS  1a. Level of Consciousness 0-->Alert, keenly responsive   1b. LOC Questions 0-->Answers both questions correctly   1c. LOC Commands 0-->Performs both tasks correctly   2.   Best Gaze 0-->Normal   3.   Visual (S) 3-->Bilateral hemianopia (blind including cortical blindness) (R eye worse, b/l decreased vision)   4.   Facial Palsy (S) 1-->Minor paralysis (flattened nasolabial fold, asymmetry on smiling) (L)   5a. Motor Arm, Left 0-->No drift, limb holds 90 (or 45) degrees for full 10 secs   5b. Motor Arm, Right 0-->No drift, limb holds 90 (or 45) degrees for full 10 secs   6a. Motor Leg, Left 0-->No drift, leg holds 30 degree position for full 5 secs   6b. Motor Leg, right 0-->No drift, leg holds 30 degree position for full 5 secs   7.   Limb Ataxia 0-->Absent   8.   Sensory (S) 1-->Mild-to-moderate sensory loss, patient feels pinprick is less sharp or is dull on the affected side, or there is a loss of superficial pain with pinprick, but patient is aware of being touched (R face/arm/leg numbness compared to L)   9.   Best Language (S) 1-->Mild-to-moderate aphasia, some obvious loss of fluency or facility of comprehension, without significant limitation on ideas expressed or form of expression. Reduction of speech and/or comprehension, however, makes conversation. . . (see row details) (can't see smaller picture to describe, sees most larger pictures fine, mild decreased fluency appears vision related with reading)   10. Dysarthria 0-->Normal   11. Extinction and Inattention  0-->No abnormality   Total 6 (02/26/24 1110)       Imaging  I personally reviewed all imaging; relevant findings per HPI.    Labs Data   CBC  Recent Labs   Lab 02/26/24  0522 02/25/24  1312   WBC 4.0 4.0   RBC 3.86* 3.98*   HGB 10.8* 11.2*   HCT 33.1* 34.2*    144*     Basic Metabolic Panel   Recent Labs   Lab 02/26/24  1203 02/26/24  0646 02/26/24  0522 02/25/24  1320 02/25/24  1312   NA  --   --  139   "--  138   POTASSIUM  --   --  3.8  --  4.3   CHLORIDE  --   --  105  --  105   CO2  --   --  23  --  25   BUN  --   --  12.8  --  10.6   CR  --   --  0.97  --  1.13   * 90 106*   < > 112*   BETHEL  --   --  10.1  --  10.0    < > = values in this interval not displayed.     Liver Panel  No results for input(s): \"PROTTOTAL\", \"ALBUMIN\", \"BILITOTAL\", \"ALKPHOS\", \"AST\", \"ALT\", \"BILIDIRECT\" in the last 168 hours.  INR    Recent Labs   Lab Test 02/25/24  1312   INR 1.08           Stroke Consult Data Data   Telestroke Service Details  (for non-emergent stroke consult with tele)  Video start time 02/26/24   1208   Video end time 02/26/24   1257   Type of service telemedicine diagnostic assessment of acute neurological changes   Reason telemedicine is appropriate patient requires assessment with a specialist for diagnosis and treatment of neurological symptoms   Mode of transmission secure interactive audio and video communication per Verna   Originating site (patient location) [facility]    Distant site (provider location) [facility]       I have personally spent a total of 70 minutes providing care today, time spent in reviewing medical records and devising the plan as recorded above.     *All or a portion of this note was generated using voice recognition software and may contain transcription errors.     "

## 2024-02-26 NOTE — PROGRESS NOTES
Care Management Follow Up    Length of Stay (days): 0    Expected Discharge Date: 02/27/2024     Concerns to be Addressed: discharge planning       Patient plan of care discussed at interdisciplinary rounds: Yes    Anticipated Discharge Disposition: Home     Anticipated Discharge Services: Home Care    Anticipated Discharge DME: None    Patient/family educated on Medicare website which has current facility and service quality ratings: yes    Education Provided on the Discharge Plan: Yes (AVS per bedside RN)    Patient/Family in Agreement with the Plan: yes    Referrals Placed by CM/SW: Post Acute Facilities        Additional Information:  CM reviewed chart. CM met with patient and he wants to go home with nephew. Patient is ok with CM updating Sebastian (son). Family will provide transportation. Patient lives with Sebastian (nephew).       Referrals pending  [facility]      Home Care referrals  [facility]     PT Recommendations: Transitional Care Facility   OT recommendations: home with assist     Anna Richards RN Care Manager

## 2024-02-26 NOTE — H&P
Hospital Medicine Service History and Physical  [facility]: [facility]    Donnie Trinidad is a 63 year old male  PMHx:  Chief Complaint: Right lower extremity weakness and left sided facial drooping     Hospital Course   2/25: Admitted from the ED with facial drooping and unilateral leg weakness that began the evening before.   Assessment & Plan   Suspected CVA: Consult tele-stroke neurology. MRI in the AM if eye implant compatible, ED MD reports they will obtain records.  Received loading dose Plavix, neurology recommends dual antiplatelet which are ordered for tomorrow.    ID-T2DM: Continue home insulin lantus 32u bedtime. Sliding scale insulin ordered. Metformin held after contrast. Carb count diet. Check A1c.     Hypertension: Permissive hypertension given likely CVA. PRN Hydralazine/labetalol with parameters. Follow post ischemic stroke BP control parameters    Normocytic Anemia: Mild. Outpt management.  Thrombocytopenia: Mild, outpatient recheck     DVTP: Mechanical  Code Status: Full Code  Disposition: Observation   Discharge: 1 day     History of Present Illness  Donnie Trinidad noticed yesterday that his right leg was weak and he was having a difficult time ambulating. He thinks it came on gradually, doesn't think he felt numb on that side. He reports noticing some weakness in his right arm though believes that has resolved. He does not report any visual changes, any difficulty speaking or swallowing. He denies knowing that the left side of his face or mouth felt abnormal. He denies feeling confused, though family (who were not in the room at this point) reported acute mental changes. Currently, he denies HA, dizziness, visual changes, neck pain, cp, palpations, sob, nausea. He reports losing bladder control within the last day or two. Denies loss of bowel control. Denies numbness or tingling to either extremity. Has been told that he has hypertension, he has not taking any medications for it. No  ingestion of any recreational drugs. Never a smoker. Drinks a 1-2 glasses of wine a week. Consistently takes his home medications that are prescribed, including 81 mg asp.     ED triage note:  The patient presents to the ED via EMS from home with concerns for gait changes and a facial droop since yesterday. Patient assessed by provider upon arrival and tier 2 stroke code called. Transported to CT on EMS stretcher.    ED course:  In the ED, patient was admitted with right sided LE weakness and a left sided facial droop. Head CT r/o hemorrhagic stroke but cannot r/o ischemic process.  Received plavix and aspirin. Plan for MRI on 2/26      Results for orders placed or performed during the hospital encounter of 02/25/24   CT Head w/o Contrast    Impression    CONCLUSION:   HEAD CT:  1.  Subtle thickening of the interhemispheric falx, which is likely chronic. However, in the absence of prior comparison studies, a trace parafalcine subdural is difficult to exclude. Attention to this is recommended on follow-up studies.  2.  No evidence for acute vascular territorial infarction. Consider MRI for further evaluation, as clinically appropriate.  3.  Multiple chronic-appearing infarctions as described above.  4.  Age-related change.    HEAD CTA:   1.  No evidence of proximal large vessel occlusion.  2.  Moderate left anterior M2 segment stenosis.  3.  Moderate proximal right posterior M2 segment stenosis.  4.  Multifocal moderate and moderate-to-severe A2 and A3 segment stenoses.  5.  Diffuse irregularity and mild-to-moderate narrowing of distal branches, compatible with intracranial atherosclerosis.  6.  Multiple small ICA aneurysm suspected bilaterally, as above.    NECK CTA:  1.  Bilateral ICA stenosis of less than 50% based on NASCET criteria.  2.  No findings specific for dissection.    Results discussed with Johnnie Contreras on 2/25/2024 1:34 PM CST.    CTA Head Neck with Contrast    Impression    CONCLUSION:   HEAD  CT:  1.  Subtle thickening of the interhemispheric falx, which is likely chronic. However, in the absence of prior comparison studies, a trace parafalcine subdural is difficult to exclude. Attention to this is recommended on follow-up studies.  2.  No evidence for acute vascular territorial infarction. Consider MRI for further evaluation, as clinically appropriate.  3.  Multiple chronic-appearing infarctions as described above.  4.  Age-related change.    HEAD CTA:   1.  No evidence of proximal large vessel occlusion.  2.  Moderate left anterior M2 segment stenosis.  3.  Moderate proximal right posterior M2 segment stenosis.  4.  Multifocal moderate and moderate-to-severe A2 and A3 segment stenoses.  5.  Diffuse irregularity and mild-to-moderate narrowing of distal branches, compatible with intracranial atherosclerosis.  6.  Multiple small ICA aneurysm suspected bilaterally, as above.    NECK CTA:  1.  Bilateral ICA stenosis of less than 50% based on NASCET criteria.  2.  No findings specific for dissection.    Results discussed with Johnnie Contreras on 2/25/2024 1:34 PM CST.       Medications   aspirin (ASA) tablet 325 mg (325 mg Oral $Given 2/25/24 1413)   lidocaine 1 % 0.1-1 mL (has no administration in time range)   lidocaine (LMX4) cream (has no administration in time range)   sodium chloride (PF) 0.9% PF flush 3 mL (has no administration in time range)   sodium chloride (PF) 0.9% PF flush 3 mL (has no administration in time range)   Medication Instructions - Avoid dextrose in IV solutions. (has no administration in time range)   medication instruction - No oral meds if patient didn't pass dysphagia screen (has no administration in time range)   labetalol (NORMODYNE/TRANDATE) injection 10-20 mg (has no administration in time range)     Or   hydrALAZINE (APRESOLINE) injection 10-20 mg (has no administration in time range)   clopidogrel (PLAVIX) tablet 75 mg (has no administration in time range)   insulin  "glargine (LANTUS PEN) injection 32 Units (has no administration in time range)   simvastatin (ZOCOR) tablet 10 mg (has no administration in time range)   travoprost CHAY FREE (TRAVATAN Z) 0.004 % ophthalmic solution 1 drop (has no administration in time range)   dorzolamide (TRUSOPT) 2 % ophthalmic solution 1 drop (has no administration in time range)   brimonidine-timolol (COMBIGAN) 0.2-0.5 % ophthalmic solution 1 drop (has no administration in time range)   netarsudil (RHOPRESSA) 0.02 % ophthalmic solution 1 drop (has no administration in time range)   enzalutamide (XTANDI) capsule 160 mg (has no administration in time range)   linagliptin-metFORMIN (JENTADUETO) 2.5-1000 MG per tablet 1 tablet ( Oral Automatically Held 2/28/24 1800)   dorzolamide (TRUSOPT) 2 % ophthalmic solution 1 drop (has no administration in time range)   iopamidol (ISOVUE-370) solution 100 mL (75 mLs Intravenous $Given 2/25/24 1314)   clopidogrel (PLAVIX) tablet 300 mg (300 mg Oral $Given 2/25/24 1413)     Physical exam:  General: Calm affect, NAD  HEENT: Normocephalic, Anicteric sclera, evidence of postoperative pupillary changes, some food on the left side of his face.  Neck: normal ROM, OP pink and moist no lesions  Respiratory: lungs cta, no increased WOB  Cardiac: RRR w/o murmers, no edema, pedal and radial pulses intact  GI: soft, nt to palpation   Gu: incontinent. NO CVA tenderness  Skin: No rashes or lesions to visualized area  Neuro: Oriented x3. Left sided facial drop (slight). Wears glasses, CN otherwise intact.  Lower extremity plantarflexion and dorsiflexion symmetric strength. slight RLE weakness straight leg raise, symmetric hamstring strength.  Reflexes symmetric bilaterally.  Absent Babinski bilaterally.  No clonus.  Finger spreading strength slightly weaker right hand (he is right-handed).  Reported sensation right finger pads \"feel like sandpaper\"   Vital signs reviewed by me    Wt Readings from Last 4 Encounters:   02/25/24 " 77.8 kg (171 lb 7 oz)          family history is not on file.   has no past surgical history on file.   No Known Allergies    Nirmal Swan MD, MPH  [facility]   Phone: #[phone]

## 2024-02-26 NOTE — UTILIZATION REVIEW
Admission Status; Secondary Review Determination   Under the authority of the Utilization Management Committee, the utilization review process indicated a secondary review on Donnie Trinidad. The review outcome is based on review of the medical records, discussions with staff, and applying clinical experience noted on the date of the review.   (x) Inpatient Status Appropriate - This patient's medical care is consistent with medical management for inpatient care and reasonable inpatient medical practice.     RATIONALE FOR DETERMINATION   Donnie Trinidad is a 63 yr old male with DM2 who presented with right leg and left face weakness.  Suspected lacunar ischemic stroke.  Unclear if able to obtain MRI (eye implants) but discussed with Dr. Salgado and still obtaining ongoing acute CVA evaluation and not discharging.  Will cross second night.    At the time of admission with the information available to the attending physician more than 2 nights Hospital complex care was anticipated, based on patient risk of adverse outcome if treated as outpatient and complex care required. Inpatient admission is appropriate based on the Medicare guidelines.   The information on this document is developed by the utilization review team in order for the business office to ensure compliance. This only denotes the appropriateness of proper admission status and does not reflect the quality of care rendered.   The definitions of Inpatient Status and Observation Status used in making the determination above are those provided in the CMS Coverage Manual, Chapter 1 and Chapter 6, section 70.4.   Sincerely,   Giovanna Robertson MD  Utilization Review  Physician Advisor  [facility]

## 2024-02-26 NOTE — PROGRESS NOTES
[facility]    Medicine Progress Note - Hospitalist Service    Date of Admission:  2/25/2024    Assessment & Plan    62 yo male with history of diabetes mellitus type 2 who presents with approximately 24 hours of right leg weakness and left facial muscle weakness.  CT head with suspected acute lacunar infarct on left. CTA head/neck with R ICA and R MCA stenosis.  PT recommending TCU placement. Stroke neurology to see today.     Suspected lacunar acute ischemic stroke on left:   Consult tele-stroke neurology. MRI in the AM if eye implant compatible.  ED MD reports they will obtain records.    Continue Plavix and ASA dual antiplatelet therapy.   TTE in process.   Telemetry  Troponin T negative.  PT/OT/SLP  Appreciate stroke neurology recommendations.      ID-T2DM: Continue home insulin lantus 32u bedtime. Sliding scale insulin ordered. Metformin held after contrast. Carb count diet.   Hemoglobin A1c 7.0    Dyslipidemia  .   Start lipitor 40 mg po qpm    Hypertension: Permissive hypertension given likely CVA. PRN Hydralazine/labetalol with parameters. Follow post ischemic stroke BP control parameters     Normocytic Anemia: Mild. Outpt management.  Thrombocytopenia: Mild, outpatient recheck        Diet: Low Saturated Fat Na <2400 mg    DVT Prophylaxis: Pneumatic Compression Devices  Bradley Catheter: Not present  Lines: None     Cardiac Monitoring: ACTIVE order. Indication: Stroke, acute (48 hours)  Code Status: Full Code      Clinically Significant Risk Factors Present on Admission                      # DMII: A1C = 7.0 % (Ref range: <5.7 %) within past 6 months        # Financial/Environmental Concerns: none         Disposition Plan      Expected Discharge Date: 02/27/2024      Destination: home with help/services;nursing home;other (comment) (Home with services vs. TCU placement)  Discharge Comments: likey home at discharge            Michael Salgado DO  Hospitalist Service  [facility]  "[facility]  Securely message with Bri (more info)  Text page via [facility] Paging/Directory   ______________________________________________________________________    Interval History   Donnie has no new complaints.  Patient felt unsteady with ambulation which was helped with physical therapy assistance.  He was able to ambulate in the hallway.  Denies any headache, vision changes, chest pain.  No abdominal pain.  Tolerating diet no dysphagia.    Physical Exam   Vital Signs: Temp: 98.2  F (36.8  C) Temp src: Oral BP: (!) 143/79 Pulse: 57   Resp: 18 SpO2: 100 % O2 Device: None (Room air)    Weight: 171 lbs 7 oz    General: Calm affect, NAD  HEENT: Normocephalic, Anicteric sclera, evidence of postoperative pupillary changes, some food on the left side of his face.  Neck: normal ROM, OP pink and moist no lesions  Respiratory: lungs cta, no increased WOB  Cardiac: RRR w/o murmers, no edema, pedal and radial pulses intact  GI: soft, nt to palpation   Gu: incontinent. NO CVA tenderness  Skin: No rashes or lesions to visualized area  Neuro: Oriented x3. Left sided facial drop (slight). Wears glasses, CN otherwise intact.  Lower extremity plantarflexion and dorsiflexion symmetric strength. slight RLE weakness straight leg raise, symmetric hamstring strength.  Reflexes symmetric bilaterally.  Absent Babinski bilaterally.  No clonus.  Finger spreading strength slightly weaker right hand (he is right-handed).  Reported sensation right finger pads \"feel like sandpaper\"   Vital signs reviewed by me    Medical Decision Making       40 MINUTES SPENT BY ME on the date of service doing chart review, history, exam, documentation & further activities per the note.      Data     I have personally reviewed the following data over the past 24 hrs:    4.0  \   10.8 (L)   / 163     139 105 12.8 /  90   3.8 23 0.97 \     Trop: 11 BNP: N/A     TSH: N/A T4: N/A A1C: 7.0 (H)     INR:  1.08 PTT:  23   D-dimer:  N/A Fibrinogen:  " N/A       Imaging results reviewed over the past 24 hrs:   Recent Results (from the past 24 hour(s))   CT Head w/o Contrast    Narrative    [facility]  1. CT HEAD W/O CONTRAST  2. CTA HEAD NECK W CONTRAST  2/25/2024 1:12 PM CST     INDICATION: Facial droop, gait abnormality.  TECHNIQUE: Head and neck CT angiogram with IV contrast. Noncontrast head CT followed by axial helical CT images of the head and neck vessels obtained during the arterial phase of intravenous contrast administration. Axial helical 2D reconstructed images   and multiplanar 3D MIP reconstructed images of the head and neck vessels were performed by the technologist. Dose reduction techniques were used.  CONTRAST:   1. None.  2. 75 mL Isovue-370.  COMPARISON: None.    FINDINGS:   NONCONTRAST HEAD CT:   INTRACRANIAL CONTENTS: Subtle thickening along the falx is favored to represent chronic dural thickening (image 22 of series 5, for example). However, in the absence of prior comparison studies, a trace 2 mm subdural hematoma is difficult to entirely   exclude. No mass effect.  No CT evidence of acute infarct. Chronic-appearing infarctions of the left basal ganglia left frontal periventricular white matter/callosal genu, and thalami. There is mild scattered white matter hypoattenuation, which is   nonspecific. This commonly reflects chronic small vessel ischemic change. Diffuse and proportionate prominence of the ventricles, sulci and cisterns is compatible with moderate generalized parenchymal atrophy. The sella is unremarkable for technique. The   cerebellar tonsils are appropriately positioned.     VISUALIZED ORBITS/SINUSES/MASTOIDS: Prior bilateral cataract surgery. Visualized portions of the orbits are otherwise unremarkable. Presumed bilateral glaucoma drainage devices. Please correlate with the operative history. No middle ear or mastoid   effusion.    BONES/SOFT TISSUES: No scalp hematoma. No skull fracture.    HEAD  CTA:   ANTERIOR CIRCULATION: The intracranial internal carotid arteries demonstrate multifocal mild stenosis. The anterior cerebral arteries demonstrate multifocal moderate and moderate-to-severe A2 and A3 segment stenosis bilaterally. There is a moderate left   proximal anterior M2 segment stenosis. There is moderate proximal posterior right M2 segment stenosis. There is mild proximal right M1 segment stenosis. Questionable medially directed 2 mm clinoidal ICA aneurysm (image 298 of series 6. 1.5 mm right   posterior communicating artery infundibulum or aneurysm. Laterally directed contour irregularity of the left distal cavernous ICA (image 302 of series 6) may represent atherosclerotic plaque or a 1 mm aneurysm.    POSTERIOR CIRCULATION: The intradural vertebral, basilar and visualized proximal cerebellar arteries appear patent. The right posterior cerebral artery demonstrates mild-to-moderate multifocal P2 segment stenosis. The left posterior cerebral artery is   unremarkable. No aneurysm or high flow vascular malformation is demonstrated.     DURAL VENOUS SINUSES: Expected enhancement of the major dural venous sinuses.    NECK CTA:  RIGHT CAROTID: Atherosclerotic plaque contributes to right ICA stenosis of less than 50% based on NASCET criteria. The ICA is tortuous.    LEFT CAROTID: Atherosclerotic plaque contributes to left ICA stenosis of less than 50% based on NASCET criteria.    VERTEBRAL ARTERIES: Dominant left and smaller right vertebral arteries are patent in the neck and into the head.     AORTIC ARCH: There is a left-sided aortic arch. No flow-limiting stenosis is apparent at the origins of the brachiocephalic, common carotid, subclavian or vertebral arteries.    MISCELLANEOUS: The visualized lung apices are well aerated. The soft tissues of the neck, including the thyroid and parotid glands, are grossly unremarkable for technique. Mild cervical spondylosis.       Impression    CONCLUSION:   HEAD  CT:  1.  Subtle thickening of the interhemispheric falx, which is likely chronic. However, in the absence of prior comparison studies, a trace parafalcine subdural is difficult to exclude. Attention to this is recommended on follow-up studies.  2.  No evidence for acute vascular territorial infarction. Consider MRI for further evaluation, as clinically appropriate.  3.  Multiple chronic-appearing infarctions as described above.  4.  Age-related change.    HEAD CTA:   1.  No evidence of proximal large vessel occlusion.  2.  Moderate left anterior M2 segment stenosis.  3.  Moderate proximal right posterior M2 segment stenosis.  4.  Multifocal moderate and moderate-to-severe A2 and A3 segment stenoses.  5.  Diffuse irregularity and mild-to-moderate narrowing of distal branches, compatible with intracranial atherosclerosis.  6.  Multiple small ICA aneurysm suspected bilaterally, as above.    NECK CTA:  1.  Bilateral ICA stenosis of less than 50% based on NASCET criteria.  2.  No findings specific for dissection.    Results discussed with Johnnie Contreras on 2/25/2024 1:34 PM CST.    CTA Head Neck with Contrast    [facility]  1. CT HEAD W/O CONTRAST  2. CTA HEAD NECK W CONTRAST  2/25/2024 1:12 PM CST     INDICATION: Facial droop, gait abnormality.  TECHNIQUE: Head and neck CT angiogram with IV contrast. Noncontrast head CT followed by axial helical CT images of the head and neck vessels obtained during the arterial phase of intravenous contrast administration. Axial helical 2D reconstructed images   and multiplanar 3D MIP reconstructed images of the head and neck vessels were performed by the technologist. Dose reduction techniques were used.  CONTRAST:   1. None.  2. 75 mL Isovue-370.  COMPARISON: None.    FINDINGS:   NONCONTRAST HEAD CT:   INTRACRANIAL CONTENTS: Subtle thickening along the falx is favored to represent chronic dural thickening (image 22 of series 5, for example).  However, in the absence of prior comparison studies, a trace 2 mm subdural hematoma is difficult to entirely   exclude. No mass effect.  No CT evidence of acute infarct. Chronic-appearing infarctions of the left basal ganglia left frontal periventricular white matter/callosal genu, and thalami. There is mild scattered white matter hypoattenuation, which is   nonspecific. This commonly reflects chronic small vessel ischemic change. Diffuse and proportionate prominence of the ventricles, sulci and cisterns is compatible with moderate generalized parenchymal atrophy. The sella is unremarkable for technique. The   cerebellar tonsils are appropriately positioned.     VISUALIZED ORBITS/SINUSES/MASTOIDS: Prior bilateral cataract surgery. Visualized portions of the orbits are otherwise unremarkable. Presumed bilateral glaucoma drainage devices. Please correlate with the operative history. No middle ear or mastoid   effusion.    BONES/SOFT TISSUES: No scalp hematoma. No skull fracture.    HEAD CTA:   ANTERIOR CIRCULATION: The intracranial internal carotid arteries demonstrate multifocal mild stenosis. The anterior cerebral arteries demonstrate multifocal moderate and moderate-to-severe A2 and A3 segment stenosis bilaterally. There is a moderate left   proximal anterior M2 segment stenosis. There is moderate proximal posterior right M2 segment stenosis. There is mild proximal right M1 segment stenosis. Questionable medially directed 2 mm clinoidal ICA aneurysm (image 298 of series 6. 1.5 mm right   posterior communicating artery infundibulum or aneurysm. Laterally directed contour irregularity of the left distal cavernous ICA (image 302 of series 6) may represent atherosclerotic plaque or a 1 mm aneurysm.    POSTERIOR CIRCULATION: The intradural vertebral, basilar and visualized proximal cerebellar arteries appear patent. The right posterior cerebral artery demonstrates mild-to-moderate multifocal P2 segment stenosis. The  left posterior cerebral artery is   unremarkable. No aneurysm or high flow vascular malformation is demonstrated.     DURAL VENOUS SINUSES: Expected enhancement of the major dural venous sinuses.    NECK CTA:  RIGHT CAROTID: Atherosclerotic plaque contributes to right ICA stenosis of less than 50% based on NASCET criteria. The ICA is tortuous.    LEFT CAROTID: Atherosclerotic plaque contributes to left ICA stenosis of less than 50% based on NASCET criteria.    VERTEBRAL ARTERIES: Dominant left and smaller right vertebral arteries are patent in the neck and into the head.     AORTIC ARCH: There is a left-sided aortic arch. No flow-limiting stenosis is apparent at the origins of the brachiocephalic, common carotid, subclavian or vertebral arteries.    MISCELLANEOUS: The visualized lung apices are well aerated. The soft tissues of the neck, including the thyroid and parotid glands, are grossly unremarkable for technique. Mild cervical spondylosis.       Impression    CONCLUSION:   HEAD CT:  1.  Subtle thickening of the interhemispheric falx, which is likely chronic. However, in the absence of prior comparison studies, a trace parafalcine subdural is difficult to exclude. Attention to this is recommended on follow-up studies.  2.  No evidence for acute vascular territorial infarction. Consider MRI for further evaluation, as clinically appropriate.  3.  Multiple chronic-appearing infarctions as described above.  4.  Age-related change.    HEAD CTA:   1.  No evidence of proximal large vessel occlusion.  2.  Moderate left anterior M2 segment stenosis.  3.  Moderate proximal right posterior M2 segment stenosis.  4.  Multifocal moderate and moderate-to-severe A2 and A3 segment stenoses.  5.  Diffuse irregularity and mild-to-moderate narrowing of distal branches, compatible with intracranial atherosclerosis.  6.  Multiple small ICA aneurysm suspected bilaterally, as above.    NECK CTA:  1.  Bilateral ICA stenosis of less than  50% based on NASCET criteria.  2.  No findings specific for dissection.    Results discussed with Johnnie Contreras on 2/25/2024 1:34 PM CST.

## 2024-02-27 ENCOUNTER — APPOINTMENT (OUTPATIENT)
Dept: SPEECH THERAPY | Facility: CLINIC | Age: 64
DRG: 066 | End: 2024-02-27
Attending: HOSPITALIST
Payer: COMMERCIAL

## 2024-02-27 ENCOUNTER — APPOINTMENT (OUTPATIENT)
Dept: OCCUPATIONAL THERAPY | Facility: CLINIC | Age: 64
DRG: 066 | End: 2024-02-27
Payer: COMMERCIAL

## 2024-02-27 ENCOUNTER — APPOINTMENT (OUTPATIENT)
Dept: MRI IMAGING | Facility: CLINIC | Age: 64
DRG: 066 | End: 2024-02-27
Attending: EMERGENCY MEDICINE
Payer: COMMERCIAL

## 2024-02-27 ENCOUNTER — TELEPHONE (OUTPATIENT)
Dept: NEUROSURGERY | Facility: CLINIC | Age: 64
End: 2024-02-27
Payer: COMMERCIAL

## 2024-02-27 ENCOUNTER — APPOINTMENT (OUTPATIENT)
Dept: PHYSICAL THERAPY | Facility: CLINIC | Age: 64
DRG: 066 | End: 2024-02-27
Payer: COMMERCIAL

## 2024-02-27 LAB
GLUCOSE BLDC GLUCOMTR-MCNC: 106 MG/DL (ref 70–99)
GLUCOSE BLDC GLUCOMTR-MCNC: 109 MG/DL (ref 70–99)
GLUCOSE BLDC GLUCOMTR-MCNC: 166 MG/DL (ref 70–99)
GLUCOSE BLDC GLUCOMTR-MCNC: 84 MG/DL (ref 70–99)

## 2024-02-27 PROCEDURE — 97116 GAIT TRAINING THERAPY: CPT | Mod: GP

## 2024-02-27 PROCEDURE — 250N000013 HC RX MED GY IP 250 OP 250 PS 637: Performed by: EMERGENCY MEDICINE

## 2024-02-27 PROCEDURE — 92610 EVALUATE SWALLOWING FUNCTION: CPT | Mod: GN

## 2024-02-27 PROCEDURE — 97530 THERAPEUTIC ACTIVITIES: CPT | Mod: GP

## 2024-02-27 PROCEDURE — A9585 GADOBUTROL INJECTION: HCPCS | Performed by: INTERNAL MEDICINE

## 2024-02-27 PROCEDURE — 250N000013 HC RX MED GY IP 250 OP 250 PS 637: Performed by: INTERNAL MEDICINE

## 2024-02-27 PROCEDURE — 70553 MRI BRAIN STEM W/O & W/DYE: CPT

## 2024-02-27 PROCEDURE — 97535 SELF CARE MNGMENT TRAINING: CPT | Mod: GO

## 2024-02-27 PROCEDURE — 99239 HOSP IP/OBS DSCHRG MGMT >30: CPT | Performed by: INTERNAL MEDICINE

## 2024-02-27 PROCEDURE — 255N000002 HC RX 255 OP 636: Performed by: INTERNAL MEDICINE

## 2024-02-27 PROCEDURE — 250N000013 HC RX MED GY IP 250 OP 250 PS 637: Performed by: HOSPITALIST

## 2024-02-27 PROCEDURE — 97110 THERAPEUTIC EXERCISES: CPT | Mod: GO

## 2024-02-27 PROCEDURE — 210N000002 HC R&B HEART CARE

## 2024-02-27 RX ORDER — CLOPIDOGREL BISULFATE 75 MG/1
75 TABLET ORAL DAILY
Qty: 90 TABLET | Refills: 0 | Status: SHIPPED | OUTPATIENT
Start: 2024-02-28

## 2024-02-27 RX ORDER — GADOBUTROL 604.72 MG/ML
7 INJECTION INTRAVENOUS ONCE
Status: COMPLETED | OUTPATIENT
Start: 2024-02-27 | End: 2024-02-27

## 2024-02-27 RX ORDER — ASPIRIN 325 MG
325 TABLET ORAL DAILY
Qty: 90 TABLET | Refills: 3 | Status: SHIPPED | OUTPATIENT
Start: 2024-02-28

## 2024-02-27 RX ORDER — ATORVASTATIN CALCIUM 40 MG/1
40 TABLET, FILM COATED ORAL EVERY EVENING
Qty: 30 TABLET | Refills: 2 | Status: SHIPPED | OUTPATIENT
Start: 2024-02-27

## 2024-02-27 RX ADMIN — BRIMONIDINE TARTRATE, TIMOLOL MALEATE 1 DROP: 2; 5 SOLUTION/ DROPS TOPICAL at 10:39

## 2024-02-27 RX ADMIN — ATORVASTATIN CALCIUM 40 MG: 40 TABLET, FILM COATED ORAL at 20:44

## 2024-02-27 RX ADMIN — DORZOLAMIDE HYDROCHLORIDE 1 DROP: 20 SOLUTION/ DROPS OPHTHALMIC at 10:26

## 2024-02-27 RX ADMIN — INSULIN GLARGINE 32 UNITS: 100 INJECTION, SOLUTION SUBCUTANEOUS at 21:52

## 2024-02-27 RX ADMIN — ASPIRIN 325 MG ORAL TABLET 325 MG: 325 PILL ORAL at 10:25

## 2024-02-27 RX ADMIN — GADOBUTROL 7 ML: 604.72 INJECTION INTRAVENOUS at 08:23

## 2024-02-27 RX ADMIN — DORZOLAMIDE HYDROCHLORIDE 1 DROP: 20 SOLUTION/ DROPS OPHTHALMIC at 20:40

## 2024-02-27 RX ADMIN — CLOPIDOGREL BISULFATE 75 MG: 75 TABLET ORAL at 10:25

## 2024-02-27 RX ADMIN — BRIMONIDINE TARTRATE, TIMOLOL MALEATE 1 DROP: 2; 5 SOLUTION/ DROPS TOPICAL at 20:45

## 2024-02-27 ASSESSMENT — ACTIVITIES OF DAILY LIVING (ADL)
ADLS_ACUITY_SCORE: 34
ADLS_ACUITY_SCORE: 33
ADLS_ACUITY_SCORE: 34
ADLS_ACUITY_SCORE: 33
ADLS_ACUITY_SCORE: 34
ADLS_ACUITY_SCORE: 33
ADLS_ACUITY_SCORE: 34
ADLS_ACUITY_SCORE: 33
ADLS_ACUITY_SCORE: 33
ADLS_ACUITY_SCORE: 34
ADLS_ACUITY_SCORE: 34
ADLS_ACUITY_SCORE: 33
ADLS_ACUITY_SCORE: 33
ADLS_ACUITY_SCORE: 34
ADLS_ACUITY_SCORE: 34
ADLS_ACUITY_SCORE: 33
ADLS_ACUITY_SCORE: 34

## 2024-02-27 NOTE — CONSULTS
Care Management Discharge Note    Discharge Date: 02/27/2024       Discharge Disposition: Home, Home Care    Discharge Services: None    Discharge DME: Walker    Discharge Transportation: family or friend will provide    Private pay costs discussed: Transportation    Education Provided on the Discharge Plan: Yes (AVS per bedside RN)    Persons Notified of Discharge Plans: patient    Patient/Family in Agreement with the Plan: yes    Handoff Referral Completed: Yes    Additional Information:    CM met with pt and spouse; spoke to nephfox Cortes via speaker phone from pt's room.  Pt hesitant about TCU but agreeable to TCU.  TCU referrals sent/pending in [place], [place], [facility], [place] areas as per pt and family's wishes.  Transportation TBD.    2:42 PM   Discharge orders received.  Pt now agreeable to discharge to home with homecare services.    [facility] - Accepted for homecare PT, OT.  Discharge orders sent to [facility].    Transportation - Pt states he does not have keys to enter his home (address in [facility] confirmed).  Pt states most of his family members work in the evening.    Tete Cortes - Called and left voicemail, per pt's request.    3:48 PM  Spouse Patricia states she is waiting for family to  Patricia from home, then they will come to [facility] and transport pt to home in the next hour or so.    Update given to bedside RN.    Denita Madrigal RN

## 2024-02-27 NOTE — PLAN OF CARE
Pt alert and oriented and able to verbalize needs. Had tele stroke visit today. Still having weakness to RLE. Pt has full range of motion and able to bear weight and ambulate. Stroke scale done. Bed and chair alarms on for safety. Hourly rounding done. Pt denies pain or discomfort. NSR on tele.   Problem: Stroke, Ischemic (Includes Transient Ischemic Attack)  Goal: Effective Oxygenation and Ventilation  Outcome: Adequate for Care Transition  Goal: Safe and Effective Swallow  Outcome: Adequate for Care Transition  Goal: Effective Urinary Elimination  Outcome: Adequate for Care Transition

## 2024-02-27 NOTE — PLAN OF CARE
Problem: Stroke, Ischemic (Includes Transient Ischemic Attack)  Goal: Improved Communication Skills  Outcome: Progressing  Goal: Improved Sensorimotor Function  Outcome: Progressing  Intervention: Optimize Range of Motion, Motor Control and Function  Recent Flowsheet Documentation  Taken 2/27/2024 0345 by Brook Viveros RN  Positioning/Transfer Devices:   pillows   in use  Taken 2/26/2024 2332 by Brook Viveros, RN  Positioning/Transfer Devices:   pillows   in use   Goal Outcome Evaluation:       Neuro check's unchanged from previous assessments. Patient states RLE weakness noted when he tries to walk but no weakness noted in bed.

## 2024-02-27 NOTE — PROVIDER NOTIFICATION
Pts wife arrived for discharge instructions and to transport pt home and was very upset that he was not going to a TCU. Pt, wife and Sebastian (nephew) all confirmed that after this morning's conversation with CM they were under the impression he would be going to TCU. I relayed to family that per CM notes he has been requesting to go home despite TCU referrals sent. Pt and family endorse wanting him to stay admitted until tomorrow so he can be sent to TCU. Explained that we cannot guarantee a TCU bed will be available tmrw, family understanding.

## 2024-02-27 NOTE — PROGRESS NOTES
02/27/24 1055   Appointment Info   Signing Clinician's Name / Credentials (SLP) Roshni Simmons MS CCC-SLP   General Information   Onset of Illness/Injury or Date of Surgery 02/25/24   Referring Physician Nirmal Swan MD   Patient/Family Therapy Goal Statement (SLP) None stated   Pertinent History of Current Problem The pt is a 64 yo male with history of diabetes mellitus type 2, glaucoma with eye implants who presents with approximately 24 hours of right leg weakness and left facial muscle weakness.  CT head with suspected acute lacunar infarct on left. CTA head/neck with R ICA and R MCA stenosis. Still awaiting clearance for MRI for eye implants.  Patient has elected to return to home, when appropriate, for home cares. Swallow evaluation completed per MD order.   General Observations The pt is alert, pleasant, and agreeable to evaluation.   Pain Assessment   Patient Currently in Pain No   Type of Evaluation   Type of Evaluation Swallow Evaluation   Oral Motor   Oral Musculature generally intact   Structural Abnormalities none present   Mucosal Quality good   Dentition (Oral Motor)   Dentition (Oral Motor) adequate dentition   Facial Symmetry (Oral Motor)   Facial Symmetry (Oral Motor) left side impairment   Comment, Facial Symmetry (Oral Motor) Reduced mobility when speaking noted   Left Side Facial Asymmetry minimal impairment   Lip Function (Oral Motor)   Lip Range of Motion (Oral Motor) protrusion impairment   Protrusion, Lip Range of Motion left side;minimal impairment   Tongue Function (Oral Motor)   Tongue ROM (Oral Motor) WNL   Jaw Function (Oral Motor)   Jaw Function (Oral Motor) WNL   Facial Sensation   Facial Sensation WNL   Cough/Swallow/Gag Reflex (Oral Motor)   Volitional Throat Clear/Cough (Oral Motor) WNL   Volitional Swallow (Oral Motor) WNL   Vocal Quality/Secretion Management (Oral Motor)   Vocal Quality (Oral Motor) WFL   Secretion Management (Oral Motor) WNL   Comment, Vocal  Quality/Secretion Management (Oral Motor) Soft voicing which pt reports is baseline   General Swallowing Observations   Past History of Dysphagia No hx of dysphagia per pt or chart review. Pt passed RN swallow screen.   Respiratory Support room air   Current Diet/Method of Nutritional Intake (General Swallowing Observations, NIS) thin liquids (level 0);regular diet   Swallowing Evaluation Clinical swallow evaluation   Clinical Swallow Evaluation   Feeding Assistance no assistance needed   Clinical Swallow Evaluation Textures Trialed thin liquids;solid foods   Clinical Swallow Eval: Thin Liquid Texture Trial   Mode of Presentation, Thin Liquids straw;self-fed   Volume of Liquid or Food Presented 6 oz   Oral Phase of Swallow WFL   Pharyngeal Phase of Swallow intact   Diagnostic Statement No s/s of aspiration or difficulty with swallowing.   Clinical Swallow Evaluation: Solid Food Texture Trial   Mode of Presentation self-fed   Volume Presented salad   Oral Phase WFL   Pharyngeal Phase intact   Diagnostic Statement No s/s of aspiration or difficulty with swallowing. Timely mastication with no oral residuals.   Esophageal Phase of Swallow   Patient reports or presents with symptoms of esophageal dysphagia No   Swallowing Recommendations   Diet Consistency Recommendations thin liquids (level 0);regular diet   Supervision Level for Intake patient independent   Medication Administration Recommendations, Swallowing (SLP) per pt preference   Instrumental Assessment Recommendations instrumental evaluation not recommended at this time   Clinical Impression   Criteria for Skilled Therapeutic Interventions Met (SLP Eval) No problems identified which require skilled intervention;Evaluation only   SLP Diagnosis Functional oropharyngeal swallow mechanism   Risks & Benefits of therapy have been explained evaluation/treatment results reviewed;care plan/treatment goals reviewed;risks/benefits reviewed;current/potential barriers  reviewed;participants voiced agreement with care plan;participants included;patient   Clinical Impression Comments Clinical swallow evaluation completed per MD order. The pt presents with funcitonal oropharyngeal swallow mechanism during bedside swallow assessment. Oral mech significant for minimally reduced left labial movement when speaking and protrusion. Pt assessed with thin liquids and regular solids. Oral phase WFL with timely mastication, no pocketing/residuals, and no anterior loss. Pt consumed PO with no s/s of aspiration and denied difficulty with swallowing. Pt has been tolerating meals for 2 days without concern. Recommend continue regular diet and thin liquids. No further SLP services recommended as the pt does not present with dysphagia and reports that speech/communication are at baseline.   SLP Total Evaluation Time   Eval: oral/pharyngeal swallow function, clinical swallow Minutes (69521) 16   SLP Discharge Planning   SLP Plan d/c   SLP Discharge Recommendation home   SLP Rationale for DC Rec No further SLP services indicated.   SLP Brief overview of current status  Recommend continue regular diet and thin liquids. No further SLP services recommended as the pt does not present with dysphagia and reports that speech/communication are at baseline.

## 2024-02-27 NOTE — PLAN OF CARE
Goal Outcome Evaluation:      Plan of Care Reviewed With: patient, spouse  Patient is alert and oriented and able to communicate his needs to staff. Right sided weakness in leg has decreased and patien thas been walking in [facility] with PT.Patient has some difficulty completing the [employer] [facility] [employer] assessment d/I his vision limitations. MRI was able to be completed today,as it was safe to complete it even with patient's eye stents. Plans are for potential discharge today. Cardiac home monitor was ordered upon discharged.

## 2024-02-27 NOTE — PROGRESS NOTES
[facility]    Medicine Progress Note - Hospitalist Service    Date of Admission:  2/25/2024    Assessment & Plan   64 yo male with history of diabetes mellitus type 2, glaucoma with eye implants who presents with approximately 24 hours of right leg weakness and left facial muscle weakness.  CT head with suspected acute lacunar infarct on left. CTA head/neck with R ICA and R MCA stenosis. Still awaiting clearance for MRI for eye implants.  Patient has elected to return to home, when appropriate, for home cares.     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  TTE LVEF 55-60%, no WMA, no significant valve disease.   Troponin T negative.    MRI in process.   Plavix and ASA dual antiplatelet therapy.   PT/OT.  PT recommending TCU, patient electing to go home with home cares.     Appreciate stroke neurology recommendations.   Follow-up with neuroendovascular team for bilateral ICA aneurysms   Follow-up with sleep medicine clinic to evaluate for sleep apnea     ID-T2DM:   Continue home insulin lantus 32u bedtime.   Continue insulin Novolog correction scale.   Metformin held after contrast.   Hemoglobin A1c 7.0    Dyslipidemia  .   lipitor 40 mg po qpm    Hypertension: Permissive hypertension given likely CVA. PRN Hydralazine/labetalol with parameters. Follow post ischemic stroke BP control parameters     Normocytic Anemia: Mild. Outpt management.  Thrombocytopenia: Mild, outpatient recheck          Diet: Low Saturated Fat Na <2400 mg    DVT Prophylaxis: Pneumatic Compression Devices  Bradley Catheter: Not present  Lines: None     Cardiac Monitoring: ACTIVE order. Indication: Stroke, acute (48 hours)  Code Status: Full Code      Clinically Significant Risk Factors Present on Admission                  # Hypertension: Noted on problem list     # DMII: A1C = 7.0 % (Ref range: <5.7 %) within past 6 months        # Financial/Environmental Concerns: none          Disposition Plan      Expected Discharge Date: 2024      Destination: home with family;home with help/services  Discharge Comments: likey home at discharge            Lindsay Salgado DO  Hospitalist Service  [facility]  Securely message with [employer] (more info)  Text page via [employer] Paging/Directory   ______________________________________________________________________    Interval History   No new complaints.  His balance with ambulation has improved according to his report.    Physical Exam   Vital Signs: Temp: 97.7  F (36.5  C) Temp src: Oral BP: (!) 144/88 (RN notified) Pulse: 72   Resp: 18 SpO2: 100 % O2 Device: None (Room air)    Weight: 155 lbs 3.2 oz    General: Calm affect, NAD  HEENT: NC/AT, Anicteric sclera.  Neck: normal ROM, OP pink and moist no lesions  Respiratory: CTAB, no increased WOB  Cardiac: RRR w/o murmers, no edema, pedal and radial pulses intact  GI: soft, nt to palpation   Gu: incontinent. NO CVA tenderness  Skin: No rashes or lesions to visualized area  Neuro: Oriented x3. Left sided facial drop (slight) over left lip. Wears glasses, Lower extremity plantarflexion and dorsiflexion symmetric strength. Symmetric hamstring strength.  Reflexes symmetric bilaterally.  Absent Babinski bilaterally.  No clonus.         Medical Decision Making       40 MINUTES SPENT BY ME on the date of service doing chart review, history, exam, documentation & further activities per the note.      Data         Imaging results reviewed over the past 24 hrs:   Recent Results (from the past 24 hour(s))   Echocardiogram Complete   Result Value    LVEF  55-60%    Narrative    [id number]  [id number]  [id number]  [id number]^KEYA^[place] [address]     Name: STEVEN SOLER  MRN: [id number]  : 1960  Study Date: 2024 01:06 PM  Age: 63 yrs  Gender: Male  Patient Location: [facility]  Reason For Study: CVA  Ordering Physician:  LINDSAY LAURA  Performed By: DALILA     BSA: 1.8 m2  Height: 66 in  Weight: 155 lb  HR: 65  BP: 159/84 mmHg  ______________________________________________________________________________  Procedure  Complete Bubble Echo Adult. Definity (NDC #58985-196) given intravenously.  ______________________________________________________________________________  Interpretation Summary     The left ventricle is normal in size.  The visual ejection fraction is 55-60%.  No regional wall motion abnormalities noted.  Normal right ventricle size and systolic function.  There is systolic anterior motion of the chordal apparatus.  Sinus rhythm was noted.  There is no comparison study available.  ______________________________________________________________________________  Left Ventricle  The left ventricle is normal in size. Proximal septal thickening is noted.  There is normal left ventricular wall thickness. The visual ejection fraction  is 55-60%. Diastolic Doppler findings (E/E' ratio and/or other parameters)  suggest left ventricular filling pressures are indeterminate. No regional wall  motion abnormalities noted.     Right Ventricle  Normal right ventricle size and systolic function. TAPSE is normal, which is  consistent with normal right ventricular systolic function.     Atria  Normal left atrial size. Right atrial size is normal. A contrast injection  (Bubble Study) was performed that was negative for flow across the interatrial  septum.     Mitral Valve  There is systolic anterior motion of the chordal apparatus. Mitral valve  leaflets appear normal. There is no mitral regurgitation noted. There is no  mitral valve stenosis.     Tricuspid Valve  The tricuspid valve is not well visualized. There is trace to mild tricuspid  regurgitation. The right ventricular systolic pressure is approximated at  18mmHg plus the right atrial pressure. There is no tricuspid stenosis.     Aortic Valve  The aortic valve is trileaflet.  There is mild aortic sclerosis of the non-  coronary cusp. No aortic regurgitation is present. No aortic stenosis is  present.     Pulmonic Valve  The pulmonic valve is not well visualized. There is trace pulmonic valvular  regurgitation. There is no pulmonic valvular stenosis.     Vessels  The aorta root is normal. Normal size ascending aorta.     Pericardium  There is no pericardial effusion.     Rhythm  Sinus rhythm was noted.  ______________________________________________________________________________  MMode/2D Measurements & Calculations     IVSd: 1.7 cm  LVIDd: 4.5 cm  LVIDs: 3.5 cm  LVPWd: 1.2 cm  FS: 21.5 %  LV mass(C)d: 255.2 grams  LV mass(C)dI: 142.2 grams/m2  Ao root diam: 2.6 cm  asc Aorta Diam: 3.3 cm  LVOT diam: 1.9 cm  LVOT area: 3.0 cm2  Ao root diam index Ht(cm/m): 1.5  Ao root diam index BSA (cm/m2): 1.4  Asc Ao diam index BSA (cm/m2): 1.8  Asc Ao diam index Ht(cm/m): 2.0     LA Volume Indexed (AL/bp): 21.9 ml/m2  RV Base: 3.2 cm  RWT: 0.54  TAPSE: 2.0 cm     Time Measurements  MM HR: 70.0 BPM     Doppler Measurements & Calculations  MV E max sushil: 41.1 cm/sec  MV A max sushil: 54.4 cm/sec  MV E/A: 0.76  MV max P.7 mmHg  MV mean P.48 mmHg  MV V2 VTI: 14.2 cm  MVA(VTI): 3.6 cm2  MV dec slope: 129.8 cm/sec2  MV dec time: 0.32 sec  Ao V2 max: 110.5 cm/sec  Ao max P.0 mmHg  Ao V2 mean: 65.2 cm/sec  Ao mean P.1 mmHg  Ao V2 VTI: 21.0 cm  ALLISON(I,D): 2.5 cm2  ALLISON(V,D): 2.5 cm2  LV V1 max PG: 3.5 mmHg  LV V1 max: 93.8 cm/sec  LV V1 VTI: 17.3 cm  SV(LVOT): 51.5 ml  SI(LVOT): 28.7 ml/m2     PA V2 max: 83.5 cm/sec  PA max P.8 mmHg  PA acc time: 0.10 sec  TR max sushil: 212.8 cm/sec  TR max P.1 mmHg  AV Sushil Ratio (DI): 0.85  ALLISON Index (cm2/m2): 1.4  E/E': 8.7  E/E' av.1  Lateral E/e': 5.6  Medial E/e': 8.6  Peak E' Sushil: 4.7 cm/sec  RV S Sushil: 10.7 cm/sec     ______________________________________________________________________________  Report approved by: Opal Roy  02/26/2024 02:30 PM

## 2024-02-27 NOTE — DISCHARGE SUMMARY
[facility]  Hospitalist Discharge Summary      Date of Admission:  2/25/2024  Date of Discharge:  2/27/2024  Discharging Provider: Michael Salgado DO  Discharge Service: Hospitalist Service    Discharge Diagnoses   Left frontal lobe ischemic infarction  Multiple small bilateral internal carotid artery aneurysms  Diabetes mellitus type 2  Dyslipidemia  Essential hypertension  Normocytic anemia  Thrombocytopenia    Clinically Significant Risk Factors     # DMII: A1C = 7.0 % (Ref range: <5.7 %) within past 6 months       Follow-ups Needed After Discharge   Follow-up Appointments     Follow-up and recommended labs and tests       Follow up with primary care provider, ADEEL WEI, within 7 days for   hospital follow- up.  The following labs/tests are recommended: BMP, CBC.    Optimization for LDL to goal of 40-70.  Optimization of blood pressure   control < 130/80mmHg.    Follow up with any stroke RENU in 6-8 weeks.  Follow-up with neuroendovascular team for bilateral ICA aneurysms.  Follow-up with sleep medicine clinic to evaluate for sleep apnea.    Discharge Disposition   Discharged to home with home care PT and OT  Condition at discharge: Stable    Hospital Course   64 yo male with history of diabetes mellitus type 2, glaucoma with eye implants who presents with approximately 24 hours of right leg weakness and left facial muscle weakness.  CT head with suspected acute lacunar infarct on left frontal lobe. CTA head/neck with R ICA and R MCA stenosis and suspected bilateral internal carotid artery aneurysm.  MRI of the brain revealed multiple areas of infarction in the left anterior cerebral artery distribution likely secondary to symptomatic atherosclerosis due to hypertension and near syncope.  Patient was recommended dual antiplatelet therapy for 90 days with indefinite aspirin use.  Simvastatin was discontinued and Lipitor was started for high intensity statin reduction of LDL to  goal of 40-70.  PT and OT evaluated patient.  Physical therapy recommended TCU placement but patient has elected to return to home with home care and family support.  30-day CardioNet monitoring device will be placed prior to discharge.    Consultations This Hospital Stay   CARE MANAGEMENT / SOCIAL WORK IP CONSULT  NEUROLOGY IP STROKE CONSULT  SPEECH LANGUAGE PATH ADULT IP CONSULT  PHARMACY IP CONSULT  PHARMACY IP CONSULT  PHARMACY IP CONSULT  PHYSICAL THERAPY ADULT IP CONSULT  OCCUPATIONAL THERAPY ADULT IP CONSULT  REHAB ADMISSIONS LIAISON IP CONSULT  CARE MANAGEMENT / SOCIAL WORK IP CONSULT  SMOKING CESSATION PROGRAM IP CONSULT    Code Status   Full Code    Time Spent on this Encounter   I, Michael Salgado DO, personally saw the patient today and spent greater than 30 minutes discharging this patient.       Michael Salgado DO  [facility]  [address] [place] [id number]  Phone: [phone]  Fax: [phone]  ______________________________________________________________________    Physical Exam   Vital Signs: Temp: 98.3  F (36.8  C) Temp src: Oral BP: (!) 161/78 (RN notified) Pulse: 68   Resp: 18 SpO2: 100 % O2 Device: None (Room air)    Weight: 154 lbs 11.2 oz  General: Calm affect, NAD  HEENT: NC/AT, Anicteric sclera.  Neck: normal ROM, OP pink and moist no lesions  Respiratory: CTAB, no increased WOB  Cardiac: RRR w/o murmers, no edema, pedal and radial pulses intact  GI: soft, nt to palpation   Gu: incontinent. NO CVA tenderness  Skin: No rashes or lesions to visualized area  Neuro: Oriented x3. Left sided facial drop (slight) over left lip. Wears glasses, Lower extremity plantarflexion and dorsiflexion symmetric strength. Symmetric hamstring strength.  Reflexes symmetric bilaterally.  Absent Babinski bilaterally.  No clonus.            Primary Care Physician   ADEEL WEI    Discharge Orders      Adult Neurosurgery  Referral      Adult  "Sleep Eval & Management Referral      Home Care Referral      Reason for your hospital stay    Acute left anterior cerebral artery territory infarcts secondary to intracerebral atherosclerotic disease.   Multiple small bilateral ICA aneurysms     Follow-up and recommended labs and tests     Follow up with primary care provider, ADEEL WEI, within 7 days for hospital follow- up.  The following labs/tests are recommended: BMP.  Optimization for LDL to goal of 40-70.  Optimization of blood pressure control < 130/80mmHg.    Follow up with any stroke RENU in 6-8 weeks.  Follow-up with neuroendovascular team for bilateral ICA aneurysms.  Follow-up with sleep medicine clinic to evaluate for sleep apnea.      .     Activity    Your activity upon discharge: activity as tolerated     Home Blood Pressure Monitor Order for DME - ONLY FOR DME    Home blood pressure monitorin. Avoid eating, smoking, and exercising for at least 30 minutes before taking a reading.    2. Put your left arm through the cuff loop. The bottom of the cuff should be about 1/2\" above your elbow. The cuff tubing should be positioned along the middle of the inside of your arm.    3. Pull the cuff so it is tightened evenly around your arm. Press the velcro material together to secure the cuff.    4. Sit in a chair with your feet flat on the floor. Rest your left arm on a table so that the arm cuff is at the same level as your heart.    5. Turn the monitor on. When the heart symbol appears next to a zero, the monitor is ready to measure. Inflate the cuff using either the automatic start button or the inflation bulb, depending on which monitor you are using.    6. Remain still during the reading. The cuff will deflate automatically.    7. When measurement is complete, your blood pressure and pulse readings will alternately display on the digital panel.    8. Wait 5-10 minutes before taking another reading.      I, the undersigned, certify that the " above prescribed supplies are medically necessary for this patient and is both reasonable and necessary in reference to accepted standards of medical and necessary in reference to accepted standards of medical practice in the treatment of this patient's condition and is not prescribed as a convenience.        Walker Order for DME - ONLY FOR DME    I, the undersigned, certify that the above prescribed supplies are medically necessary for this patient and is both reasonable and necessary in reference to accepted standards of medical and necessary in reference to accepted standards of medical practice in the treatment of this patient's condition and is not prescribed as a convenience.      Diet    Follow this diet upon discharge: Orders Placed This Encounter      Mediterranean diet.     Stroke Hospital Follow Up (for neurologist use only)    [facility] will call you to coordinate care as prescribed by your provider. If you don t hear from a representative within 2 business days, please call [phone].         Significant Results and Procedures   Most Recent 3 CBC's:  Recent Labs   Lab Test 02/26/24  0522 02/25/24  1312   WBC 4.0 4.0   HGB 10.8* 11.2*   MCV 86 86    144*     Most Recent 3 BMP's:  Recent Labs   Lab Test 02/27/24  1216 02/27/24  0900 02/26/24  2059 02/26/24  0646 02/26/24  0522 02/25/24  1320 02/25/24  1312   NA  --   --   --   --  139  --  138   POTASSIUM  --   --   --   --  3.8  --  4.3   CHLORIDE  --   --   --   --  105  --  105   CO2  --   --   --   --  23  --  25   BUN  --   --   --   --  12.8  --  10.6   CR  --   --   --   --  0.97  --  1.13   ANIONGAP  --   --   --   --  11  --  8   BETHEL  --   --   --   --  10.1  --  10.0   * 84 137*   < > 106*   < > 112*    < > = values in this interval not displayed.     Most Recent 2 LFT's:No lab results found.  Most Recent 3 INR's:  Recent Labs   Lab Test 02/25/24  1312   INR 1.08     Most Recent Hemoglobin A1c:  Recent Labs   Lab Test  02/25/24  1312   A1C 7.0*       Results for orders placed or performed during the hospital encounter of 02/25/24   CT Head w/o Contrast    Narrative    [facility]  1. CT HEAD W/O CONTRAST  2. CTA HEAD NECK W CONTRAST  2/25/2024 1:12 PM CST     INDICATION: Facial droop, gait abnormality.  TECHNIQUE: Head and neck CT angiogram with IV contrast. Noncontrast head CT followed by axial helical CT images of the head and neck vessels obtained during the arterial phase of intravenous contrast administration. Axial helical 2D reconstructed images   and multiplanar 3D MIP reconstructed images of the head and neck vessels were performed by the technologist. Dose reduction techniques were used.  CONTRAST:   1. None.  2. 75 mL Isovue-370.  COMPARISON: None.    FINDINGS:   NONCONTRAST HEAD CT:   INTRACRANIAL CONTENTS: Subtle thickening along the falx is favored to represent chronic dural thickening (image 22 of series 5, for example). However, in the absence of prior comparison studies, a trace 2 mm subdural hematoma is difficult to entirely   exclude. No mass effect.  No CT evidence of acute infarct. Chronic-appearing infarctions of the left basal ganglia left frontal periventricular white matter/callosal genu, and thalami. There is mild scattered white matter hypoattenuation, which is   nonspecific. This commonly reflects chronic small vessel ischemic change. Diffuse and proportionate prominence of the ventricles, sulci and cisterns is compatible with moderate generalized parenchymal atrophy. The sella is unremarkable for technique. The   cerebellar tonsils are appropriately positioned.     VISUALIZED ORBITS/SINUSES/MASTOIDS: Prior bilateral cataract surgery. Visualized portions of the orbits are otherwise unremarkable. Presumed bilateral glaucoma drainage devices. Please correlate with the operative history. No middle ear or mastoid   effusion.    BONES/SOFT TISSUES: No scalp hematoma. No skull  fracture.    HEAD CTA:   ANTERIOR CIRCULATION: The intracranial internal carotid arteries demonstrate multifocal mild stenosis. The anterior cerebral arteries demonstrate multifocal moderate and moderate-to-severe A2 and A3 segment stenosis bilaterally. There is a moderate left   proximal anterior M2 segment stenosis. There is moderate proximal posterior right M2 segment stenosis. There is mild proximal right M1 segment stenosis. Questionable medially directed 2 mm clinoidal ICA aneurysm (image 298 of series 6. 1.5 mm right   posterior communicating artery infundibulum or aneurysm. Laterally directed contour irregularity of the left distal cavernous ICA (image 302 of series 6) may represent atherosclerotic plaque or a 1 mm aneurysm.    POSTERIOR CIRCULATION: The intradural vertebral, basilar and visualized proximal cerebellar arteries appear patent. The right posterior cerebral artery demonstrates mild-to-moderate multifocal P2 segment stenosis. The left posterior cerebral artery is   unremarkable. No aneurysm or high flow vascular malformation is demonstrated.     DURAL VENOUS SINUSES: Expected enhancement of the major dural venous sinuses.    NECK CTA:  RIGHT CAROTID: Atherosclerotic plaque contributes to right ICA stenosis of less than 50% based on NASCET criteria. The ICA is tortuous.    LEFT CAROTID: Atherosclerotic plaque contributes to left ICA stenosis of less than 50% based on NASCET criteria.    VERTEBRAL ARTERIES: Dominant left and smaller right vertebral arteries are patent in the neck and into the head.     AORTIC ARCH: There is a left-sided aortic arch. No flow-limiting stenosis is apparent at the origins of the brachiocephalic, common carotid, subclavian or vertebral arteries.    MISCELLANEOUS: The visualized lung apices are well aerated. The soft tissues of the neck, including the thyroid and parotid glands, are grossly unremarkable for technique. Mild cervical spondylosis.       Impression     CONCLUSION:   HEAD CT:  1.  Subtle thickening of the interhemispheric falx, which is likely chronic. However, in the absence of prior comparison studies, a trace parafalcine subdural is difficult to exclude. Attention to this is recommended on follow-up studies.  2.  No evidence for acute vascular territorial infarction. Consider MRI for further evaluation, as clinically appropriate.  3.  Multiple chronic-appearing infarctions as described above.  4.  Age-related change.    HEAD CTA:   1.  No evidence of proximal large vessel occlusion.  2.  Moderate left anterior M2 segment stenosis.  3.  Moderate proximal right posterior M2 segment stenosis.  4.  Multifocal moderate and moderate-to-severe A2 and A3 segment stenoses.  5.  Diffuse irregularity and mild-to-moderate narrowing of distal branches, compatible with intracranial atherosclerosis.  6.  Multiple small ICA aneurysm suspected bilaterally, as above.    NECK CTA:  1.  Bilateral ICA stenosis of less than 50% based on NASCET criteria.  2.  No findings specific for dissection.    Results discussed with Johnnie Contreras on 2/25/2024 1:34 PM CST.    CTA Head Neck with Contrast    [facility]  1. CT HEAD W/O CONTRAST  2. CTA HEAD NECK W CONTRAST  2/25/2024 1:12 PM CST     INDICATION: Facial droop, gait abnormality.  TECHNIQUE: Head and neck CT angiogram with IV contrast. Noncontrast head CT followed by axial helical CT images of the head and neck vessels obtained during the arterial phase of intravenous contrast administration. Axial helical 2D reconstructed images   and multiplanar 3D MIP reconstructed images of the head and neck vessels were performed by the technologist. Dose reduction techniques were used.  CONTRAST:   1. None.  2. 75 mL Isovue-370.  COMPARISON: None.    FINDINGS:   NONCONTRAST HEAD CT:   INTRACRANIAL CONTENTS: Subtle thickening along the falx is favored to represent chronic dural thickening (image 22 of  series 5, for example). However, in the absence of prior comparison studies, a trace 2 mm subdural hematoma is difficult to entirely   exclude. No mass effect.  No CT evidence of acute infarct. Chronic-appearing infarctions of the left basal ganglia left frontal periventricular white matter/callosal genu, and thalami. There is mild scattered white matter hypoattenuation, which is   nonspecific. This commonly reflects chronic small vessel ischemic change. Diffuse and proportionate prominence of the ventricles, sulci and cisterns is compatible with moderate generalized parenchymal atrophy. The sella is unremarkable for technique. The   cerebellar tonsils are appropriately positioned.     VISUALIZED ORBITS/SINUSES/MASTOIDS: Prior bilateral cataract surgery. Visualized portions of the orbits are otherwise unremarkable. Presumed bilateral glaucoma drainage devices. Please correlate with the operative history. No middle ear or mastoid   effusion.    BONES/SOFT TISSUES: No scalp hematoma. No skull fracture.    HEAD CTA:   ANTERIOR CIRCULATION: The intracranial internal carotid arteries demonstrate multifocal mild stenosis. The anterior cerebral arteries demonstrate multifocal moderate and moderate-to-severe A2 and A3 segment stenosis bilaterally. There is a moderate left   proximal anterior M2 segment stenosis. There is moderate proximal posterior right M2 segment stenosis. There is mild proximal right M1 segment stenosis. Questionable medially directed 2 mm clinoidal ICA aneurysm (image 298 of series 6. 1.5 mm right   posterior communicating artery infundibulum or aneurysm. Laterally directed contour irregularity of the left distal cavernous ICA (image 302 of series 6) may represent atherosclerotic plaque or a 1 mm aneurysm.    POSTERIOR CIRCULATION: The intradural vertebral, basilar and visualized proximal cerebellar arteries appear patent. The right posterior cerebral artery demonstrates mild-to-moderate multifocal P2  segment stenosis. The left posterior cerebral artery is   unremarkable. No aneurysm or high flow vascular malformation is demonstrated.     DURAL VENOUS SINUSES: Expected enhancement of the major dural venous sinuses.    NECK CTA:  RIGHT CAROTID: Atherosclerotic plaque contributes to right ICA stenosis of less than 50% based on NASCET criteria. The ICA is tortuous.    LEFT CAROTID: Atherosclerotic plaque contributes to left ICA stenosis of less than 50% based on NASCET criteria.    VERTEBRAL ARTERIES: Dominant left and smaller right vertebral arteries are patent in the neck and into the head.     AORTIC ARCH: There is a left-sided aortic arch. No flow-limiting stenosis is apparent at the origins of the brachiocephalic, common carotid, subclavian or vertebral arteries.    MISCELLANEOUS: The visualized lung apices are well aerated. The soft tissues of the neck, including the thyroid and parotid glands, are grossly unremarkable for technique. Mild cervical spondylosis.       Impression    CONCLUSION:   HEAD CT:  1.  Subtle thickening of the interhemispheric falx, which is likely chronic. However, in the absence of prior comparison studies, a trace parafalcine subdural is difficult to exclude. Attention to this is recommended on follow-up studies.  2.  No evidence for acute vascular territorial infarction. Consider MRI for further evaluation, as clinically appropriate.  3.  Multiple chronic-appearing infarctions as described above.  4.  Age-related change.    HEAD CTA:   1.  No evidence of proximal large vessel occlusion.  2.  Moderate left anterior M2 segment stenosis.  3.  Moderate proximal right posterior M2 segment stenosis.  4.  Multifocal moderate and moderate-to-severe A2 and A3 segment stenoses.  5.  Diffuse irregularity and mild-to-moderate narrowing of distal branches, compatible with intracranial atherosclerosis.  6.  Multiple small ICA aneurysm suspected bilaterally, as above.    NECK CTA:  1.  Bilateral ICA  stenosis of less than 50% based on NASCET criteria.  2.  No findings specific for dissection.    Results discussed with Johnnie Contreras on 2024 1:34 PM CST.    MR Brain w/o & w Contrast    Narrative    EXAM: MR BRAIN W/O and W CONTRAST  LOCATION: [facility]  DATE: 2024    INDICATION: stroke symptoms, right sided  COMPARISON: 2024 CT.  CONTRAST: 7 ml Gadavist given  TECHNIQUE: Routine multiplanar multisequence head MRI without and with intravenous contrast.    FINDINGS:  INTRACRANIAL CONTENTS: Multiple small foci of diffusion restriction are noted in the paramedian left frontal lobe white matter (series 2, image 22). Chronic infarcts in the left basal ganglia, left frontal periventricular white matter, and bilateral   thalami. No mass, acute hemorrhage, or extra-axial fluid collections. Patchy nonspecific T2/FLAIR hyperintensities within the cerebral white matter most consistent with mild to moderate chronic microvascular ischemic change. Moderate generalized cerebral   atrophy. No hydrocephalus. Normal position of the cerebellar tonsils. No pathologic contrast enhancement.    SELLA: No abnormality accounting for technique.    OSSEOUS STRUCTURES/SOFT TISSUES: Normal marrow signal. The major intracranial vascular flow voids are maintained.     ORBITS: Prior bilateral cataract surgery. Visualized portions of the orbits are otherwise unremarkable.     SINUSES/MASTOIDS: No paranasal sinus mucosal disease. No middle ear or mastoid effusion.       Impression    IMPRESSION:  1.  Small acute/subacute infarcts in the left frontal lobe white matter.  2.  Generalized brain atrophy and presumed microvascular ischemic changes, as detailed above.   Echocardiogram Complete     Value    LVEF  55-60%    Narrative    [id number]  [id number]  [id number]  [id number]^KEYA^[place] [address]     Name: STEVEN SOLER  MRN: [id number]  :  1960  Study Date: 02/26/2024 01:06 PM  Age: 63 yrs  Gender: Male  Patient Location: [facility]  Reason For Study: CVA  Ordering Physician: LINDSAY LAURA  Performed By: DALILA     BSA: 1.8 m2  Height: 66 in  Weight: 155 lb  HR: 65  BP: 159/84 mmHg  ______________________________________________________________________________  Procedure  Complete Bubble Echo Adult. Definity (NDC #20367-554) given intravenously.  ______________________________________________________________________________  Interpretation Summary     The left ventricle is normal in size.  The visual ejection fraction is 55-60%.  No regional wall motion abnormalities noted.  Normal right ventricle size and systolic function.  There is systolic anterior motion of the chordal apparatus.  Sinus rhythm was noted.  There is no comparison study available.  ______________________________________________________________________________  Left Ventricle  The left ventricle is normal in size. Proximal septal thickening is noted.  There is normal left ventricular wall thickness. The visual ejection fraction  is 55-60%. Diastolic Doppler findings (E/E' ratio and/or other parameters)  suggest left ventricular filling pressures are indeterminate. No regional wall  motion abnormalities noted.     Right Ventricle  Normal right ventricle size and systolic function. TAPSE is normal, which is  consistent with normal right ventricular systolic function.     Atria  Normal left atrial size. Right atrial size is normal. A contrast injection  (Bubble Study) was performed that was negative for flow across the interatrial  septum.     Mitral Valve  There is systolic anterior motion of the chordal apparatus. Mitral valve  leaflets appear normal. There is no mitral regurgitation noted. There is no  mitral valve stenosis.     Tricuspid Valve  The tricuspid valve is not well visualized. There is trace to mild tricuspid  regurgitation. The right ventricular systolic pressure is  approximated at  18mmHg plus the right atrial pressure. There is no tricuspid stenosis.     Aortic Valve  The aortic valve is trileaflet. There is mild aortic sclerosis of the non-  coronary cusp. No aortic regurgitation is present. No aortic stenosis is  present.     Pulmonic Valve  The pulmonic valve is not well visualized. There is trace pulmonic valvular  regurgitation. There is no pulmonic valvular stenosis.     Vessels  The aorta root is normal. Normal size ascending aorta.     Pericardium  There is no pericardial effusion.     Rhythm  Sinus rhythm was noted.  ______________________________________________________________________________  MMode/2D Measurements & Calculations     IVSd: 1.7 cm  LVIDd: 4.5 cm  LVIDs: 3.5 cm  LVPWd: 1.2 cm  FS: 21.5 %  LV mass(C)d: 255.2 grams  LV mass(C)dI: 142.2 grams/m2  Ao root diam: 2.6 cm  asc Aorta Diam: 3.3 cm  LVOT diam: 1.9 cm  LVOT area: 3.0 cm2  Ao root diam index Ht(cm/m): 1.5  Ao root diam index BSA (cm/m2): 1.4  Asc Ao diam index BSA (cm/m2): 1.8  Asc Ao diam index Ht(cm/m): 2.0     LA Volume Indexed (AL/bp): 21.9 ml/m2  RV Base: 3.2 cm  RWT: 0.54  TAPSE: 2.0 cm     Time Measurements  MM HR: 70.0 BPM     Doppler Measurements & Calculations  MV E max sushil: 41.1 cm/sec  MV A max sushil: 54.4 cm/sec  MV E/A: 0.76  MV max P.7 mmHg  MV mean P.48 mmHg  MV V2 VTI: 14.2 cm  MVA(VTI): 3.6 cm2  MV dec slope: 129.8 cm/sec2  MV dec time: 0.32 sec  Ao V2 max: 110.5 cm/sec  Ao max P.0 mmHg  Ao V2 mean: 65.2 cm/sec  Ao mean P.1 mmHg  Ao V2 VTI: 21.0 cm  ALLISON(I,D): 2.5 cm2  ALLISON(V,D): 2.5 cm2  LV V1 max PG: 3.5 mmHg  LV V1 max: 93.8 cm/sec  LV V1 VTI: 17.3 cm  SV(LVOT): 51.5 ml  SI(LVOT): 28.7 ml/m2     PA V2 max: 83.5 cm/sec  PA max P.8 mmHg  PA acc time: 0.10 sec  TR max sushil: 212.8 cm/sec  TR max P.1 mmHg  AV Sushil Ratio (DI): 0.85  ALLISON Index (cm2/m2): 1.4  E/E': 8.7  E/E' av.1  Lateral E/e': 5.6  Medial E/e': 8.6  Peak E' Sushil: 4.7 cm/sec  RV S Sushil: 10.7  cm/sec     ______________________________________________________________________________  Report approved by: Opal Roy 02/26/2024 02:30 PM             Discharge Medications   Current Discharge Medication List        START taking these medications    Details   aspirin (ASA) 325 MG tablet Take 1 tablet (325 mg) by mouth daily  Qty: 90 tablet, Refills: 3    Associated Diagnoses: Cerebrovascular accident (CVA), unspecified mechanism (H)      atorvastatin (LIPITOR) 40 MG tablet Take 1 tablet (40 mg) by mouth every evening  Qty: 30 tablet, Refills: 2    Associated Diagnoses: Cerebrovascular accident (CVA), unspecified mechanism (H)      clopidogrel (PLAVIX) 75 MG tablet Take 1 tablet (75 mg) by mouth daily  Qty: 90 tablet, Refills: 0    Associated Diagnoses: Cerebrovascular accident (CVA), unspecified mechanism (H)           CONTINUE these medications which have NOT CHANGED    Details   brimonidine-timolol (COMBIGAN) 0.2-0.5 % ophthalmic solution Place 1 drop into both eyes 2 times daily      dorzolamide (TRUSOPT) 2 % ophthalmic solution Place 1 drop into both eyes 2 times daily      enzalutamide (XTANDI) 40 MG capsule Take 160 mg by mouth daily      insulin aspart (NOVOLOG FLEXPEN) 100 UNIT/ML pen Inject Subcutaneous 3 times daily (with meals) Sliding Scale      insulin glargine (LANTUS PEN) 100 UNIT/ML pen Inject 32 Units Subcutaneous at bedtime      linagliptin-metFORMIN (JENTADUETO) 2.5-1000 MG per tablet Take 1 tablet by mouth 2 times daily (with meals)      netarsudil (RHOPRESSA) 0.02 % ophthalmic solution Place 1 drop into both eyes at bedtime      travoprost CHAY FREE (TRAVATAN Z) 0.004 % ophthalmic solution Place 1 drop into both eyes at bedtime           STOP taking these medications       simvastatin (ZOCOR) 10 MG tablet Comments:   Reason for Stopping:             Allergies   No Known Allergies

## 2024-02-27 NOTE — PROGRESS NOTES
"Brief Stroke Note:   MRI with multiple areas of infarct in L JOSE distribution; likely secondary to symptomatic L JOSE atherosclerosis in the setting of hypotension/near syncope     Discussed with hospitalist. No further inpatient stroke workup recommended at this time.     Impression  Acute L JOSE territory infarcts secondary to ICAD     Multiple small bilateral ICA aneurysms     Recommendations   Acute Stroke Management:  -Neuro checks and vitals every 4 hours  - Inpatient SBP goal <180   - mg daily indefinitely  -Plavix 75 mg daily x 90 days  -stop simvastatin, start Lipitor 40 mg daily  -telemetry, 30 day CardioNet monitoring at discharge if no Afib found on telemetry (ordered)  -PT/OT/SPT, Dysphagia screen  -Euthermia, euglycemia, eunatremia  -Stroke Education  -Stroke Class per [facility] ([facility]     Secondary stroke prevention:  -follow-up with PCP for titration to goal LDL 40-70, <40 increases risk of Intracranial hemorrhage  -Mediterranean diet can be beneficial for overall decreased cardiovascular risk, please print hand out for patient at discharge   -goal HgbA1c <7% for secondary stroke prevention, follow-up with PCP  -long term outpatient blood pressure goal <130/80 to be slowly achieved over the next several weeks, recommend home monitoring twice daily in AM and PM, keep log and bring to PCP follow-up    Patient Follow-up    - with any stroke RENU in 6-8 weeks, ordered  -follow-up with neuroendovascular team for bilateral ICA aneurysms (ordered)  -Follow-up with sleep medicine clinic to evaluate for sleep apnea (ordered)    No further stroke workup is recommended, we will sign off. Please contact us for additional questions or concerns.    Kim KENNY, CNP  Vascular Neurology  To page me or covering stroke neurology team member, click here: [facility]   Choose \"On Call\" tab at top, then search dropdown box for \"Neurology Adult\", select location, press Enter, then look for stroke/neuro " ICU/telestroke.    Discussed with vascular neurology attending, Dr. Alarcon

## 2024-02-27 NOTE — PROGRESS NOTES
Physical Therapy Discharge Summary    Reason for therapy discharge:    Discharged to home with home therapy.    Progress towards therapy goal(s). See goals on Care Plan in [facility] electronic health record for goal details.  Goals partially met.  Barriers to achieving goals:   discharge from facility.    Therapy recommendation(s):    Continued therapy is recommended.  Rationale/Recommendations:  Home PT for continued mobility training and strengthening.

## 2024-02-28 ENCOUNTER — APPOINTMENT (OUTPATIENT)
Dept: OCCUPATIONAL THERAPY | Facility: CLINIC | Age: 64
DRG: 066 | End: 2024-02-28
Payer: COMMERCIAL

## 2024-02-28 ENCOUNTER — APPOINTMENT (OUTPATIENT)
Dept: CARDIOLOGY | Facility: CLINIC | Age: 64
DRG: 066 | End: 2024-02-28
Attending: PHYSICIAN ASSISTANT
Payer: COMMERCIAL

## 2024-02-28 VITALS
TEMPERATURE: 97.9 F | RESPIRATION RATE: 18 BRPM | HEART RATE: 68 BPM | OXYGEN SATURATION: 100 % | SYSTOLIC BLOOD PRESSURE: 173 MMHG | WEIGHT: 155.5 LBS | DIASTOLIC BLOOD PRESSURE: 80 MMHG

## 2024-02-28 LAB
GLUCOSE BLDC GLUCOMTR-MCNC: 109 MG/DL (ref 70–99)
GLUCOSE BLDC GLUCOMTR-MCNC: 95 MG/DL (ref 70–99)

## 2024-02-28 PROCEDURE — 250N000013 HC RX MED GY IP 250 OP 250 PS 637: Performed by: EMERGENCY MEDICINE

## 2024-02-28 PROCEDURE — 97535 SELF CARE MNGMENT TRAINING: CPT | Mod: GO

## 2024-02-28 PROCEDURE — 93270 REMOTE 30 DAY ECG REV/REPORT: CPT

## 2024-02-28 PROCEDURE — 97110 THERAPEUTIC EXERCISES: CPT | Mod: GO

## 2024-02-28 PROCEDURE — 99239 HOSP IP/OBS DSCHRG MGMT >30: CPT | Performed by: INTERNAL MEDICINE

## 2024-02-28 PROCEDURE — 250N000013 HC RX MED GY IP 250 OP 250 PS 637: Performed by: HOSPITALIST

## 2024-02-28 RX ORDER — POLYETHYLENE GLYCOL 3350 17 G/17G
17 POWDER, FOR SOLUTION ORAL DAILY PRN
Qty: 510 G | Refills: 0 | Status: SHIPPED | OUTPATIENT
Start: 2024-02-28

## 2024-02-28 RX ORDER — INSULIN ASPART 100 [IU]/ML
INJECTION, SOLUTION INTRAVENOUS; SUBCUTANEOUS
Qty: 15 ML | Refills: 3 | Status: SHIPPED | OUTPATIENT
Start: 2024-02-28

## 2024-02-28 RX ORDER — AMOXICILLIN 250 MG
1 CAPSULE ORAL 2 TIMES DAILY
Qty: 30 TABLET | Refills: 1 | Status: SHIPPED | OUTPATIENT
Start: 2024-02-28

## 2024-02-28 RX ADMIN — DORZOLAMIDE HYDROCHLORIDE 1 DROP: 20 SOLUTION/ DROPS OPHTHALMIC at 09:14

## 2024-02-28 RX ADMIN — BRIMONIDINE TARTRATE, TIMOLOL MALEATE 1 DROP: 2; 5 SOLUTION/ DROPS TOPICAL at 09:14

## 2024-02-28 RX ADMIN — ASPIRIN 325 MG ORAL TABLET 325 MG: 325 PILL ORAL at 09:14

## 2024-02-28 RX ADMIN — CLOPIDOGREL BISULFATE 75 MG: 75 TABLET ORAL at 09:14

## 2024-02-28 ASSESSMENT — ACTIVITIES OF DAILY LIVING (ADL)
ADLS_ACUITY_SCORE: 34

## 2024-02-28 NOTE — PLAN OF CARE
Problem: Stroke, Ischemic (Includes Transient Ischemic Attack)  Goal: Optimal Coping  Outcome: Progressing  Goal: Optimal Cognitive Function  Outcome: Progressing   Goal Outcome Evaluation:       Neuro's unchanged from previous assessments. Patient fatigued, denies c/o pain, cares clustered to promote rest.

## 2024-02-28 NOTE — DISCHARGE SUMMARY
[facility]  Hospitalist Discharge Summary      Date of Admission:  2/25/2024  Date of Discharge:  2/28/2024  Discharging Provider: Michael Salgado DO  Discharge Service: Hospitalist Service    Discharge Diagnoses   Left frontal lobe ischemic infarction  Multiple small bilateral internal carotid artery aneurysms  Diabetes mellitus type 2  Dyslipidemia  Essential hypertension  Normocytic anemia  Thrombocytopenia    Clinically Significant Risk Factors     # DMII: A1C = 7.0 % (Ref range: <5.7 %) within past 6 months       Follow-ups Needed After Discharge   Follow-up Appointments     Follow Up and recommended labs and tests      Follow up with Nursing home physician.        Follow-up and recommended labs and tests       Follow up with primary care provider, ADEEL WEI, within 7 days for   hospital follow- up.  The following labs/tests are recommended: BMP.    Optimization for LDL to goal of 40-70.  Optimization of blood pressure   control < 130/80mmHg.    Follow up with any stroke RENU in 6-8 weeks.  Follow-up with neuroendovascular team for bilateral ICA aneurysms.  Follow-up with sleep medicine clinic to evaluate for sleep apnea.      .          Discharge Disposition   Discharged to short-term care facility  Condition at discharge: Good    Hospital Course   62 yo male with history of diabetes mellitus type 2, glaucoma with eye implants who presents with approximately 24 hours of right leg weakness and left facial muscle weakness.  CT head with suspected acute lacunar infarct on left frontal lobe. CTA head/neck with R ICA and R MCA stenosis and suspected bilateral internal carotid artery aneurysm.  MRI of the brain revealed multiple areas of infarction in the left anterior cerebral artery distribution likely secondary to symptomatic atherosclerosis due to hypertension and near syncope.  Patient was recommended dual antiplatelet therapy for 90 days with indefinite aspirin use.   Simvastatin was discontinued and Lipitor was started for high intensity statin reduction of LDL to goal of 40-70.  PT and OT evaluated patient.  Physical therapy recommended TCU placement.  30-day CardioNet monitoring device will be placed prior to discharge.     Consultations This Hospital Stay   CARE MANAGEMENT / SOCIAL WORK IP CONSULT  NEUROLOGY IP STROKE CONSULT  SPEECH LANGUAGE PATH ADULT IP CONSULT  PHARMACY IP CONSULT  PHARMACY IP CONSULT  PHARMACY IP CONSULT  PHYSICAL THERAPY ADULT IP CONSULT  OCCUPATIONAL THERAPY ADULT IP CONSULT  REHAB ADMISSIONS LIAISON IP CONSULT  CARE MANAGEMENT / SOCIAL WORK IP CONSULT  PHYSICAL THERAPY ADULT IP CONSULT  OCCUPATIONAL THERAPY ADULT IP CONSULT  SMOKING CESSATION PROGRAM IP CONSULT    Code Status   Full Code    Time Spent on this Encounter   I, Michael Salgado DO, personally saw the patient today and spent greater than 30 minutes discharging this patient.       Michael Salgado DO  [facility]  [address] [place] [id number]  Phone: [phone]  Fax: [phone]  ______________________________________________________________________    Physical Exam   Vital Signs: Temp: 97.9  F (36.6  C) Temp src: Oral BP: (!) 173/80 (RN notified) Pulse: 71   Resp: 18 SpO2: 100 % O2 Device: None (Room air)    Weight: 155 lbs 8 oz  General: Calm affect, NAD  HEENT: NC/AT, Anicteric sclera.  Neck: normal ROM, OP pink and moist no lesions  Respiratory: CTAB, no increased WOB  Cardiac: RRR w/o murmers, no edema, pedal and radial pulses intact  GI: soft, nt to palpation   Gu: incontinent. NO CVA tenderness  Skin: No rashes or lesions to visualized area  Neuro: Oriented x3. Left sided facial drop (slight) over left lip. Wears glasses, Lower extremity plantarflexion and dorsiflexion symmetric strength. Symmetric hamstring strength.  Reflexes symmetric bilaterally.  Absent Babinski bilaterally.  No clonus.            Primary Care Physician    ADEEL WEI    Discharge Orders      Adult Neurosurgery [facility] Referral      Adult Sleep Eval & Management Referral      Reason for your hospital stay    Acute left anterior cerebral artery territory infarcts secondary to intracerebral atherosclerotic disease.   Multiple small bilateral ICA aneurysms     Follow-up and recommended labs and tests     Follow up with primary care provider, ADEEL WEI, within 7 days for hospital follow- up.  The following labs/tests are recommended: BMP.  Optimization for LDL to goal of 40-70.  Optimization of blood pressure control < 130/80mmHg.    Follow up with any stroke RENU in 6-8 weeks.  Follow-up with neuroendovascular team for bilateral ICA aneurysms.  Follow-up with sleep medicine clinic to evaluate for sleep apnea.      .     Activity    Your activity upon discharge: activity as tolerated     General info for SNF    Length of Stay Estimate: Short Term Care: Estimated # of Days <30  Condition at Discharge: Improving  Level of care:skilled   Rehabilitation Potential: Good  Admission H&P remains valid and up-to-date: Yes  Recent Chemotherapy: N/A  Use Nursing Home Standing Orders: Yes     Mantoux instructions    Give two-step Mantoux (PPD) Per Facility Policy Yes     Follow Up and recommended labs and tests    Follow up with Nursing home physician.     Activity - Up with assistive device     Activity - Up with nursing assistance     Full Code     Physical Therapy Adult Consult    Evaluate and treat as clinically indicated.    Reason:  Recent stroke, leg weakness.  Patient will need strengthening exercise to rebuild mobility.     Occupational Therapy Adult Consult    Evaluate and treat as clinically indicated.    Reason:  Recent stroke, leg weakness.  Patient will need strengthening exercise to rebuild mobility.     Fall precautions     Home Blood Pressure Monitor Order for DME - ONLY FOR DME    Home blood pressure monitorin. Avoid eating, smoking, and  "exercising for at least 30 minutes before taking a reading.    2. Put your left arm through the cuff loop. The bottom of the cuff should be about 1/2\" above your elbow. The cuff tubing should be positioned along the middle of the inside of your arm.    3. Pull the cuff so it is tightened evenly around your arm. Press the velcro material together to secure the cuff.    4. Sit in a chair with your feet flat on the floor. Rest your left arm on a table so that the arm cuff is at the same level as your heart.    5. Turn the monitor on. When the heart symbol appears next to a zero, the monitor is ready to measure. Inflate the cuff using either the automatic start button or the inflation bulb, depending on which monitor you are using.    6. Remain still during the reading. The cuff will deflate automatically.    7. When measurement is complete, your blood pressure and pulse readings will alternately display on the digital panel.    8. Wait 5-10 minutes before taking another reading.      I, the undersigned, certify that the above prescribed supplies are medically necessary for this patient and is both reasonable and necessary in reference to accepted standards of medical and necessary in reference to accepted standards of medical practice in the treatment of this patient's condition and is not prescribed as a convenience.        Walker Order for DME - ONLY FOR DME    I, the undersigned, certify that the above prescribed supplies are medically necessary for this patient and is both reasonable and necessary in reference to accepted standards of medical and necessary in reference to accepted standards of medical practice in the treatment of this patient's condition and is not prescribed as a convenience.      Diet    Follow this diet upon discharge: Orders Placed This Encounter      Mediterranean diet.     Stroke Hospital Follow Up (for neurologist use only)    [facility] will call you to coordinate care as prescribed " by your provider. If you don t hear from a representative within 2 business days, please call [phone].         Significant Results and Procedures   Most Recent 3 CBC's:  Recent Labs   Lab Test 02/26/24  0522 02/25/24  1312   WBC 4.0 4.0   HGB 10.8* 11.2*   MCV 86 86    144*     Most Recent 3 BMP's:  Recent Labs   Lab Test 02/28/24  1149 02/28/24  0829 02/27/24  2128 02/26/24  0646 02/26/24  0522 02/25/24  1320 02/25/24  1312   NA  --   --   --   --  139  --  138   POTASSIUM  --   --   --   --  3.8  --  4.3   CHLORIDE  --   --   --   --  105  --  105   CO2  --   --   --   --  23  --  25   BUN  --   --   --   --  12.8  --  10.6   CR  --   --   --   --  0.97  --  1.13   ANIONGAP  --   --   --   --  11  --  8   BETHEL  --   --   --   --  10.1  --  10.0   * 95 166*   < > 106*   < > 112*    < > = values in this interval not displayed.     Most Recent 2 LFT's:No lab results found.  Most Recent Cholesterol Panel:  Recent Labs   Lab Test 02/25/24  1312   CHOL 198   *   HDL 55   TRIG 97     Most Recent TSH and T4:No lab results found.  Most Recent Hemoglobin A1c:  Recent Labs   Lab Test 02/25/24  1312   A1C 7.0*     Most Recent Urinalysis:No lab results found.  Most Recent ESR & CRP:No lab results found.,   Results for orders placed or performed during the hospital encounter of 02/25/24   CT Head w/o Contrast    [facility]  1. CT HEAD W/O CONTRAST  2. CTA HEAD NECK W CONTRAST  2/25/2024 1:12 PM CST     INDICATION: Facial droop, gait abnormality.  TECHNIQUE: Head and neck CT angiogram with IV contrast. Noncontrast head CT followed by axial helical CT images of the head and neck vessels obtained during the arterial phase of intravenous contrast administration. Axial helical 2D reconstructed images   and multiplanar 3D MIP reconstructed images of the head and neck vessels were performed by the technologist. Dose reduction techniques were used.  CONTRAST:   1.  None.  2. 75 mL Isovue-370.  COMPARISON: None.    FINDINGS:   NONCONTRAST HEAD CT:   INTRACRANIAL CONTENTS: Subtle thickening along the falx is favored to represent chronic dural thickening (image 22 of series 5, for example). However, in the absence of prior comparison studies, a trace 2 mm subdural hematoma is difficult to entirely   exclude. No mass effect.  No CT evidence of acute infarct. Chronic-appearing infarctions of the left basal ganglia left frontal periventricular white matter/callosal genu, and thalami. There is mild scattered white matter hypoattenuation, which is   nonspecific. This commonly reflects chronic small vessel ischemic change. Diffuse and proportionate prominence of the ventricles, sulci and cisterns is compatible with moderate generalized parenchymal atrophy. The sella is unremarkable for technique. The   cerebellar tonsils are appropriately positioned.     VISUALIZED ORBITS/SINUSES/MASTOIDS: Prior bilateral cataract surgery. Visualized portions of the orbits are otherwise unremarkable. Presumed bilateral glaucoma drainage devices. Please correlate with the operative history. No middle ear or mastoid   effusion.    BONES/SOFT TISSUES: No scalp hematoma. No skull fracture.    HEAD CTA:   ANTERIOR CIRCULATION: The intracranial internal carotid arteries demonstrate multifocal mild stenosis. The anterior cerebral arteries demonstrate multifocal moderate and moderate-to-severe A2 and A3 segment stenosis bilaterally. There is a moderate left   proximal anterior M2 segment stenosis. There is moderate proximal posterior right M2 segment stenosis. There is mild proximal right M1 segment stenosis. Questionable medially directed 2 mm clinoidal ICA aneurysm (image 298 of series 6. 1.5 mm right   posterior communicating artery infundibulum or aneurysm. Laterally directed contour irregularity of the left distal cavernous ICA (image 302 of series 6) may represent atherosclerotic plaque or a 1 mm  aneurysm.    POSTERIOR CIRCULATION: The intradural vertebral, basilar and visualized proximal cerebellar arteries appear patent. The right posterior cerebral artery demonstrates mild-to-moderate multifocal P2 segment stenosis. The left posterior cerebral artery is   unremarkable. No aneurysm or high flow vascular malformation is demonstrated.     DURAL VENOUS SINUSES: Expected enhancement of the major dural venous sinuses.    NECK CTA:  RIGHT CAROTID: Atherosclerotic plaque contributes to right ICA stenosis of less than 50% based on NASCET criteria. The ICA is tortuous.    LEFT CAROTID: Atherosclerotic plaque contributes to left ICA stenosis of less than 50% based on NASCET criteria.    VERTEBRAL ARTERIES: Dominant left and smaller right vertebral arteries are patent in the neck and into the head.     AORTIC ARCH: There is a left-sided aortic arch. No flow-limiting stenosis is apparent at the origins of the brachiocephalic, common carotid, subclavian or vertebral arteries.    MISCELLANEOUS: The visualized lung apices are well aerated. The soft tissues of the neck, including the thyroid and parotid glands, are grossly unremarkable for technique. Mild cervical spondylosis.       Impression    CONCLUSION:   HEAD CT:  1.  Subtle thickening of the interhemispheric falx, which is likely chronic. However, in the absence of prior comparison studies, a trace parafalcine subdural is difficult to exclude. Attention to this is recommended on follow-up studies.  2.  No evidence for acute vascular territorial infarction. Consider MRI for further evaluation, as clinically appropriate.  3.  Multiple chronic-appearing infarctions as described above.  4.  Age-related change.    HEAD CTA:   1.  No evidence of proximal large vessel occlusion.  2.  Moderate left anterior M2 segment stenosis.  3.  Moderate proximal right posterior M2 segment stenosis.  4.  Multifocal moderate and moderate-to-severe A2 and A3 segment stenoses.  5.   Diffuse irregularity and mild-to-moderate narrowing of distal branches, compatible with intracranial atherosclerosis.  6.  Multiple small ICA aneurysm suspected bilaterally, as above.    NECK CTA:  1.  Bilateral ICA stenosis of less than 50% based on NASCET criteria.  2.  No findings specific for dissection.    Results discussed with Johnnie Contreras on 2/25/2024 1:34 PM CST.    CTA Head Neck with Contrast    [facility]  1. CT HEAD W/O CONTRAST  2. CTA HEAD NECK W CONTRAST  2/25/2024 1:12 PM CST     INDICATION: Facial droop, gait abnormality.  TECHNIQUE: Head and neck CT angiogram with IV contrast. Noncontrast head CT followed by axial helical CT images of the head and neck vessels obtained during the arterial phase of intravenous contrast administration. Axial helical 2D reconstructed images   and multiplanar 3D MIP reconstructed images of the head and neck vessels were performed by the technologist. Dose reduction techniques were used.  CONTRAST:   1. None.  2. 75 mL Isovue-370.  COMPARISON: None.    FINDINGS:   NONCONTRAST HEAD CT:   INTRACRANIAL CONTENTS: Subtle thickening along the falx is favored to represent chronic dural thickening (image 22 of series 5, for example). However, in the absence of prior comparison studies, a trace 2 mm subdural hematoma is difficult to entirely   exclude. No mass effect.  No CT evidence of acute infarct. Chronic-appearing infarctions of the left basal ganglia left frontal periventricular white matter/callosal genu, and thalami. There is mild scattered white matter hypoattenuation, which is   nonspecific. This commonly reflects chronic small vessel ischemic change. Diffuse and proportionate prominence of the ventricles, sulci and cisterns is compatible with moderate generalized parenchymal atrophy. The sella is unremarkable for technique. The   cerebellar tonsils are appropriately positioned.     VISUALIZED ORBITS/SINUSES/MASTOIDS: Prior  bilateral cataract surgery. Visualized portions of the orbits are otherwise unremarkable. Presumed bilateral glaucoma drainage devices. Please correlate with the operative history. No middle ear or mastoid   effusion.    BONES/SOFT TISSUES: No scalp hematoma. No skull fracture.    HEAD CTA:   ANTERIOR CIRCULATION: The intracranial internal carotid arteries demonstrate multifocal mild stenosis. The anterior cerebral arteries demonstrate multifocal moderate and moderate-to-severe A2 and A3 segment stenosis bilaterally. There is a moderate left   proximal anterior M2 segment stenosis. There is moderate proximal posterior right M2 segment stenosis. There is mild proximal right M1 segment stenosis. Questionable medially directed 2 mm clinoidal ICA aneurysm (image 298 of series 6. 1.5 mm right   posterior communicating artery infundibulum or aneurysm. Laterally directed contour irregularity of the left distal cavernous ICA (image 302 of series 6) may represent atherosclerotic plaque or a 1 mm aneurysm.    POSTERIOR CIRCULATION: The intradural vertebral, basilar and visualized proximal cerebellar arteries appear patent. The right posterior cerebral artery demonstrates mild-to-moderate multifocal P2 segment stenosis. The left posterior cerebral artery is   unremarkable. No aneurysm or high flow vascular malformation is demonstrated.     DURAL VENOUS SINUSES: Expected enhancement of the major dural venous sinuses.    NECK CTA:  RIGHT CAROTID: Atherosclerotic plaque contributes to right ICA stenosis of less than 50% based on NASCET criteria. The ICA is tortuous.    LEFT CAROTID: Atherosclerotic plaque contributes to left ICA stenosis of less than 50% based on NASCET criteria.    VERTEBRAL ARTERIES: Dominant left and smaller right vertebral arteries are patent in the neck and into the head.     AORTIC ARCH: There is a left-sided aortic arch. No flow-limiting stenosis is apparent at the origins of the brachiocephalic, common  carotid, subclavian or vertebral arteries.    MISCELLANEOUS: The visualized lung apices are well aerated. The soft tissues of the neck, including the thyroid and parotid glands, are grossly unremarkable for technique. Mild cervical spondylosis.       Impression    CONCLUSION:   HEAD CT:  1.  Subtle thickening of the interhemispheric falx, which is likely chronic. However, in the absence of prior comparison studies, a trace parafalcine subdural is difficult to exclude. Attention to this is recommended on follow-up studies.  2.  No evidence for acute vascular territorial infarction. Consider MRI for further evaluation, as clinically appropriate.  3.  Multiple chronic-appearing infarctions as described above.  4.  Age-related change.    HEAD CTA:   1.  No evidence of proximal large vessel occlusion.  2.  Moderate left anterior M2 segment stenosis.  3.  Moderate proximal right posterior M2 segment stenosis.  4.  Multifocal moderate and moderate-to-severe A2 and A3 segment stenoses.  5.  Diffuse irregularity and mild-to-moderate narrowing of distal branches, compatible with intracranial atherosclerosis.  6.  Multiple small ICA aneurysm suspected bilaterally, as above.    NECK CTA:  1.  Bilateral ICA stenosis of less than 50% based on NASCET criteria.  2.  No findings specific for dissection.    Results discussed with Johnnie Contreras on 2/25/2024 1:34 PM CST.    MR Brain w/o & w Contrast    Narrative    EXAM: MR BRAIN W/O and W CONTRAST  LOCATION: [facility]  DATE: 2/27/2024    INDICATION: stroke symptoms, right sided  COMPARISON: 02/25/2024 CT.  CONTRAST: 7 ml Gadavist given  TECHNIQUE: Routine multiplanar multisequence head MRI without and with intravenous contrast.    FINDINGS:  INTRACRANIAL CONTENTS: Multiple small foci of diffusion restriction are noted in the paramedian left frontal lobe white matter (series 2, image 22). Chronic infarcts in the left basal ganglia, left frontal  periventricular white matter, and bilateral   thalami. No mass, acute hemorrhage, or extra-axial fluid collections. Patchy nonspecific T2/FLAIR hyperintensities within the cerebral white matter most consistent with mild to moderate chronic microvascular ischemic change. Moderate generalized cerebral   atrophy. No hydrocephalus. Normal position of the cerebellar tonsils. No pathologic contrast enhancement.    SELLA: No abnormality accounting for technique.    OSSEOUS STRUCTURES/SOFT TISSUES: Normal marrow signal. The major intracranial vascular flow voids are maintained.     ORBITS: Prior bilateral cataract surgery. Visualized portions of the orbits are otherwise unremarkable.     SINUSES/MASTOIDS: No paranasal sinus mucosal disease. No middle ear or mastoid effusion.       Impression    IMPRESSION:  1.  Small acute/subacute infarcts in the left frontal lobe white matter.  2.  Generalized brain atrophy and presumed microvascular ischemic changes, as detailed above.   Echocardiogram Complete     Value    LVEF  55-60%    [facility]    [id number]  [id number]  [id number]  [id number] [address]     Name: STEVEN SOLER  MRN: [id number]  : 1960  Study Date: 2024 01:06 PM  Age: 63 yrs  Gender: Male  Patient Location: [facility]  Reason For Study: CVA  Ordering Physician: LINDSAY LAURA  Performed By: DALILA     BSA: 1.8 m2  Height: 66 in  Weight: 155 lb  HR: 65  BP: 159/84 mmHg  ______________________________________________________________________________  Procedure  Complete Bubble Echo Adult. Definity (NDC #73657-906) given intravenously.  ______________________________________________________________________________  Interpretation Summary     The left ventricle is normal in size.  The visual ejection fraction is 55-60%.  No regional wall motion abnormalities noted.  Normal right ventricle size and systolic function.  There is systolic anterior motion  of the chordal apparatus.  Sinus rhythm was noted.  There is no comparison study available.  ______________________________________________________________________________  Left Ventricle  The left ventricle is normal in size. Proximal septal thickening is noted.  There is normal left ventricular wall thickness. The visual ejection fraction  is 55-60%. Diastolic Doppler findings (E/E' ratio and/or other parameters)  suggest left ventricular filling pressures are indeterminate. No regional wall  motion abnormalities noted.     Right Ventricle  Normal right ventricle size and systolic function. TAPSE is normal, which is  consistent with normal right ventricular systolic function.     Atria  Normal left atrial size. Right atrial size is normal. A contrast injection  (Bubble Study) was performed that was negative for flow across the interatrial  septum.     Mitral Valve  There is systolic anterior motion of the chordal apparatus. Mitral valve  leaflets appear normal. There is no mitral regurgitation noted. There is no  mitral valve stenosis.     Tricuspid Valve  The tricuspid valve is not well visualized. There is trace to mild tricuspid  regurgitation. The right ventricular systolic pressure is approximated at  18mmHg plus the right atrial pressure. There is no tricuspid stenosis.     Aortic Valve  The aortic valve is trileaflet. There is mild aortic sclerosis of the non-  coronary cusp. No aortic regurgitation is present. No aortic stenosis is  present.     Pulmonic Valve  The pulmonic valve is not well visualized. There is trace pulmonic valvular  regurgitation. There is no pulmonic valvular stenosis.     Vessels  The aorta root is normal. Normal size ascending aorta.     Pericardium  There is no pericardial effusion.     Rhythm  Sinus rhythm was noted.  ______________________________________________________________________________  MMode/2D Measurements & Calculations     IVSd: 1.7 cm  LVIDd: 4.5 cm  LVIDs: 3.5  cm  LVPWd: 1.2 cm  FS: 21.5 %  LV mass(C)d: 255.2 grams  LV mass(C)dI: 142.2 grams/m2  Ao root diam: 2.6 cm  asc Aorta Diam: 3.3 cm  LVOT diam: 1.9 cm  LVOT area: 3.0 cm2  Ao root diam index Ht(cm/m): 1.5  Ao root diam index BSA (cm/m2): 1.4  Asc Ao diam index BSA (cm/m2): 1.8  Asc Ao diam index Ht(cm/m): 2.0     LA Volume Indexed (AL/bp): 21.9 ml/m2  RV Base: 3.2 cm  RWT: 0.54  TAPSE: 2.0 cm     Time Measurements  MM HR: 70.0 BPM     Doppler Measurements & Calculations  MV E max sushil: 41.1 cm/sec  MV A max sushil: 54.4 cm/sec  MV E/A: 0.76  MV max P.7 mmHg  MV mean P.48 mmHg  MV V2 VTI: 14.2 cm  MVA(VTI): 3.6 cm2  MV dec slope: 129.8 cm/sec2  MV dec time: 0.32 sec  Ao V2 max: 110.5 cm/sec  Ao max P.0 mmHg  Ao V2 mean: 65.2 cm/sec  Ao mean P.1 mmHg  Ao V2 VTI: 21.0 cm  ALLISON(I,D): 2.5 cm2  ALLISON(V,D): 2.5 cm2  LV V1 max PG: 3.5 mmHg  LV V1 max: 93.8 cm/sec  LV V1 VTI: 17.3 cm  SV(LVOT): 51.5 ml  SI(LVOT): 28.7 ml/m2     PA V2 max: 83.5 cm/sec  PA max P.8 mmHg  PA acc time: 0.10 sec  TR max sushil: 212.8 cm/sec  TR max P.1 mmHg  AV Sushil Ratio (DI): 0.85  ALLISON Index (cm2/m2): 1.4  E/E': 8.7  E/E' av.1  Lateral E/e': 5.6  Medial E/e': 8.6  Peak E' Sushil: 4.7 cm/sec  RV S Sushil: 10.7 cm/sec     ______________________________________________________________________________  Report approved by: Opal Roy 2024 02:30 PM             Discharge Medications   Current Discharge Medication List        START taking these medications    Details   aspirin (ASA) 325 MG tablet Take 1 tablet (325 mg) by mouth daily  Qty: 90 tablet, Refills: 3    Associated Diagnoses: Cerebrovascular accident (CVA), unspecified mechanism (H)      atorvastatin (LIPITOR) 40 MG tablet Take 1 tablet (40 mg) by mouth every evening  Qty: 30 tablet, Refills: 2    Associated Diagnoses: Cerebrovascular accident (CVA), unspecified mechanism (H)      clopidogrel (PLAVIX) 75 MG tablet Take 1 tablet (75 mg) by mouth daily  Qty: 90  tablet, Refills: 0    Associated Diagnoses: Cerebrovascular accident (CVA), unspecified mechanism (H)      polyethylene glycol (MIRALAX) 17 GM/Dose powder Take 17 g by mouth daily as needed for constipation  Qty: 510 g, Refills: 0    Associated Diagnoses: Constipation, unspecified constipation type      senna-docusate (SENOKOT-S/PERICOLACE) 8.6-50 MG tablet Take 1 tablet by mouth 2 times daily  Qty: 30 tablet, Refills: 1    Associated Diagnoses: Constipation, unspecified constipation type           CONTINUE these medications which have NOT CHANGED    Details   brimonidine-timolol (COMBIGAN) 0.2-0.5 % ophthalmic solution Place 1 drop into both eyes 2 times daily      dorzolamide (TRUSOPT) 2 % ophthalmic solution Place 1 drop into both eyes 2 times daily      enzalutamide (XTANDI) 40 MG capsule Take 160 mg by mouth daily      insulin aspart (NOVOLOG FLEXPEN) 100 UNIT/ML pen Inject Subcutaneous 3 times daily (with meals) Sliding Scale      insulin glargine (LANTUS PEN) 100 UNIT/ML pen Inject 32 Units Subcutaneous at bedtime      linagliptin-metFORMIN (JENTADUETO) 2.5-1000 MG per tablet Take 1 tablet by mouth 2 times daily (with meals)      netarsudil (RHOPRESSA) 0.02 % ophthalmic solution Place 1 drop into both eyes at bedtime      travoprost BAK FREE (TRAVATAN Z) 0.004 % ophthalmic solution Place 1 drop into both eyes at bedtime           STOP taking these medications       simvastatin (ZOCOR) 10 MG tablet Comments:   Reason for Stopping:             Allergies   No Known Allergies

## 2024-02-28 NOTE — PROGRESS NOTES
Care Management Discharge Note    Discharge Date: 02/28/2024       Discharge Disposition:  [facility]    Discharge Services: None    Discharge DME: None    Discharge Transportation: family or friend will provide    PAS Confirmation Code: [id number]    Patient/family educated on Medicare website which has current facility and service quality ratings: yes    Education Provided on the Discharge Plan: Yes (AVS per bedside RN)    Persons Notified of Discharge Plans: patient    Patient/Family in Agreement with the Plan: yes    Handoff Referral Completed: Yes    Additional Information:  CM reviewed chart. CM met with patient to update that he has been accepted at [facility] (SNF). CM asked patient if he wanted family to provide transportation or family. Patient stated to call Sebastian (heriberto). 10:33 AM     CM called Sebastian (heriberto) no answer. CM left  asking for a return call. 10:37 AM     CM sent discharge orders to [facility].     CM called Sebastian (heriberto) again. No answer.- call went right to .  11:02 AM     CM resent signed discharge orders. 11:56 AM     CM resent discharge orders to TCU. 2:24 PM     CM met with patient and Sebastian (was bedside). Sebastian will provide transportation. TCU requests patient leave [facility] between. Patient is to bring his home supply of enzalutamide (XTANDI). Patient and Sebastian aware and agreeable. Sebastian will bring the medication to TCU.  3-3:30 pm.     CM completed PAS- [id number]    Anna Richards RN

## 2024-02-28 NOTE — PROGRESS NOTES
Patient discharged to TCU with family to transport. VSS on RA. PIV access removed prior to discharge. Facility packet remains with family for TCU. RN-RN report provided by previous nurse. Questions answered, education complete. Belongings remain with patient at discharge.

## 2024-02-29 ENCOUNTER — PATIENT OUTREACH (OUTPATIENT)
Dept: CARE COORDINATION | Facility: CLINIC | Age: 64
End: 2024-02-29
Payer: COMMERCIAL

## 2024-02-29 NOTE — PROGRESS NOTES
Connected Care Resource Center: Connected [facility] [place]    Background: Transitional Care Management program identified per system criteria and reviewed by [facility] team for possible outreach.    Assessment: Upon chart review, CCRC Team member will not proceed with patient outreach related to this episode of Transitional Care Management program due to reason below:    Non-[facility] TCU: CCRC team member noted patient discharged to TCU/ARU/LTACH. Patient is not established with a [facility] currently supported by Primary Care-Care Coordination therefore handoff to Primary Care-Care Coordination is not appropriate at this time.    Plan: Transitional Care Management episode addressed appropriately per reason noted above.      Charla Buckner  Community Health Worker  [facility], [facility]  [phone]      *Connected Care Resource Team does NOT follow patient ongoing. Referrals are identified based on internal discharge reports and the outreach is to ensure patient has an understanding of their discharge instructions.

## 2024-02-29 NOTE — PROGRESS NOTES
Occupational Therapy Discharge Summary    Reason for therapy discharge:    Discharged to transitional care facility.    Progress towards therapy goal(s). See goals on Care Plan in [facility] electronic health record for goal details.  Goals partially met.  Barriers to achieving goals:   discharge from facility.    Therapy recommendation(s):    Continued therapy is recommended.  Rationale/Recommendations:  To increase indep with ADLs and trsf and fine motor coordination of the R hand.

## 2024-03-12 ENCOUNTER — LAB REQUISITION (OUTPATIENT)
Dept: LAB | Facility: CLINIC | Age: 64
End: 2024-03-12
Payer: COMMERCIAL

## 2024-03-12 DIAGNOSIS — I10 ESSENTIAL (PRIMARY) HYPERTENSION: ICD-10-CM

## 2024-03-13 LAB
ANION GAP SERPL CALCULATED.3IONS-SCNC: 11 MMOL/L (ref 7–15)
BUN SERPL-MCNC: 16.5 MG/DL (ref 8–23)
CALCIUM SERPL-MCNC: 10 MG/DL (ref 8.8–10.2)
CHLORIDE SERPL-SCNC: 100 MMOL/L (ref 98–107)
CREAT SERPL-MCNC: 0.88 MG/DL (ref 0.67–1.17)
DEPRECATED HCO3 PLAS-SCNC: 26 MMOL/L (ref 22–29)
EGFRCR SERPLBLD CKD-EPI 2021: >90 ML/MIN/1.73M2
GLUCOSE SERPL-MCNC: 85 MG/DL (ref 70–99)
POTASSIUM SERPL-SCNC: 4.1 MMOL/L (ref 3.4–5.3)
SODIUM SERPL-SCNC: 137 MMOL/L (ref 135–145)

## 2024-03-13 PROCEDURE — 36415 COLL VENOUS BLD VENIPUNCTURE: CPT | Mod: ORL | Performed by: NURSE PRACTITIONER

## 2024-03-13 PROCEDURE — 80048 BASIC METABOLIC PNL TOTAL CA: CPT | Mod: ORL | Performed by: NURSE PRACTITIONER

## 2024-03-13 PROCEDURE — P9604 ONE-WAY ALLOW PRORATED TRIP: HCPCS | Mod: ORL | Performed by: NURSE PRACTITIONER

## 2024-03-20 NOTE — TELEPHONE ENCOUNTER
RECORDS RECEIVED FROM: internal   REASON FOR VISIT: Cerebral aneurysm, nonruptured/    PROVIDER: Dr. Barros   DATE OF APPT: 4/9/24   NOTES (FOR ALL VISITS) STATUS DETAILS   OFFICE NOTE from referring provider Internal SEE INPATIENT NOTES   DISCHARGE SUMMARY from hospital Internal [facility]:  2/25/24-2/28/24   MEDICATION LIST Internal    IMAGING  (FOR ALL VISITS)     MRI (HEAD, NECK, SPINE) Internal [facility]:  MRI Brain 2/27/24   CT (HEAD, NECK, SPINE) Internal [facility]:  CTA Head Neck 2/25/24  CT Head 2/25/24         Family member

## 2024-03-22 ENCOUNTER — TELEPHONE (OUTPATIENT)
Dept: NEUROSURGERY | Facility: CLINIC | Age: 64
End: 2024-03-22
Payer: COMMERCIAL

## 2024-03-25 ENCOUNTER — TELEPHONE (OUTPATIENT)
Dept: RADIOLOGY | Facility: CLINIC | Age: 64
End: 2024-03-25
Payer: COMMERCIAL

## 2024-03-25 ENCOUNTER — TELEPHONE (OUTPATIENT)
Dept: NEUROSURGERY | Facility: CLINIC | Age: 64
End: 2024-03-25
Payer: COMMERCIAL

## 2024-03-27 ENCOUNTER — TELEPHONE (OUTPATIENT)
Dept: RADIOLOGY | Facility: CLINIC | Age: 64
End: 2024-03-27
Payer: COMMERCIAL

## 2024-03-27 NOTE — TELEPHONE ENCOUNTER
Left pt a message regarding his appt with Dr Barros on April 9th. Appt has been changed to virtual.

## 2024-04-01 PROCEDURE — 93272 ECG/REVIEW INTERPRET ONLY: CPT | Performed by: INTERNAL MEDICINE

## 2024-04-05 ENCOUNTER — TELEPHONE (OUTPATIENT)
Dept: NEUROSURGERY | Facility: CLINIC | Age: 64
End: 2024-04-05
Payer: COMMERCIAL

## 2024-04-05 NOTE — TELEPHONE ENCOUNTER
Left Voicemail (2nd Attempt) for the patient to call back and schedule the following:    Appointment type: N/A  Provider: Dr. Barros  Return date: N/A  Specialty phone number: [phone]  Additional appointment(s) needed: N/A  Additonal Notes: L/m for patient about the appointment with Dr. Barros being changed to a video visit using his cell phone. -KB

## 2024-04-09 ENCOUNTER — PRE VISIT (OUTPATIENT)
Dept: NEUROSURGERY | Facility: CLINIC | Age: 64
End: 2024-04-09

## 2024-04-09 ENCOUNTER — TELEPHONE (OUTPATIENT)
Dept: RADIOLOGY | Facility: CLINIC | Age: 64
End: 2024-04-09

## 2024-04-11 ENCOUNTER — TELEPHONE (OUTPATIENT)
Dept: NEUROSURGERY | Facility: CLINIC | Age: 64
End: 2024-04-11
Payer: COMMERCIAL

## 2024-05-07 ENCOUNTER — TELEPHONE (OUTPATIENT)
Dept: NEUROSURGERY | Facility: CLINIC | Age: 64
End: 2024-05-07

## 2024-05-07 ENCOUNTER — VIRTUAL VISIT (OUTPATIENT)
Dept: NEUROSURGERY | Facility: CLINIC | Age: 64
End: 2024-05-07
Payer: COMMERCIAL

## 2024-05-07 VITALS — WEIGHT: 160 LBS

## 2024-05-07 DIAGNOSIS — R29.90 STROKE-LIKE SYMPTOMS: Primary | ICD-10-CM

## 2024-05-07 DIAGNOSIS — I67.1 ANEURYSM OF INTERNAL CAROTID ARTERY: ICD-10-CM

## 2024-05-07 PROCEDURE — 99207 PR SATISFY VISIT NUMBER: CPT | Mod: 95 | Performed by: RADIOLOGY

## 2024-05-07 ASSESSMENT — PAIN SCALES - GENERAL: PAINLEVEL: NO PAIN (0)

## 2024-05-07 NOTE — PROGRESS NOTES
"Virtual Visit Details    Type of service:  Video Visit   Video Start Time: {video visit start/end time for provider to select:012927}  Video End Time:{video visit start/end time for provider to select:760837}    Originating Location (pt. Location): {video visit patient location:427146::\"Home\"}  {PROVIDER LOCATION On-site should be selected for visits conducted from your clinic location or adjoining [facility] hospital, academic office, or other nearby [facility] building. Off-site should be selected for all other provider locations, including home:[address]}  Distant Location (provider location):  {virtual location provider:507279}  Platform used for Video Visit: {Virtual Visit Platforms:678764::\"Attune Live\"}    "

## 2024-05-07 NOTE — PATIENT INSTRUCTIONS
We will contact you to reschedule your visit.     Stroke & Endovascular RN Care Coordinators:    Cheri Cowart, RN, BSN  Brenda Valenzuela, RN, CNRN, SCRN    If you have any questions please contact the RN Care Coordinators at [phone], option 1.     After business hours call the  at [phone] and have the Neuro-Interventional Fellow paged.    Thank you for choosing [facility] for your health care needs.

## 2024-05-07 NOTE — PROGRESS NOTES
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  He is being evaluated for facial droop and gait abnormality which showed a left JOSE territory infarct on MRI imaging.  He also has multiple intracranial atherosclerotic disease with stenoses, he was placed on dual antiplatelet therapy for 90 days.        Past medical history: No past medical history on file.     Current outpatient Medication list:   Current Outpatient Medications:     aspirin (ASA) 325 MG tablet, Take 1 tablet (325 mg) by mouth daily, Disp: 90 tablet, Rfl: 3    atorvastatin (LIPITOR) 40 MG tablet, Take 1 tablet (40 mg) by mouth every evening, Disp: 30 tablet, Rfl: 2    brimonidine-timolol (COMBIGAN) 0.2-0.5 % ophthalmic solution, Place 1 drop into both eyes 2 times daily, Disp: , Rfl:     clopidogrel (PLAVIX) 75 MG tablet, Take 1 tablet (75 mg) by mouth daily, Disp: 90 tablet, Rfl: 0    dorzolamide (TRUSOPT) 2 % ophthalmic solution, Place 1 drop into both eyes 2 times daily, Disp: , Rfl:     enzalutamide (XTANDI) 40 MG capsule, Take 160 mg by mouth daily, Disp: , Rfl:     insulin aspart (NOVOLOG FLEXPEN) 100 UNIT/ML pen, Correction Scale - MEDIUM INSULIN RESISTANCE DOSING   Do Not give Correction Insulin if Pre-Meal BG less than 140. For Pre-Meal  - 189 give 1 unit. For Pre-Meal  - 239 give 2 units. For Pre-Meal  - 289 give 3 units. For Pre-Meal  - 339 give 4 units. For Pre-Meal - 399 give 5 units. For Pre-Meal -449 give 6 units For Pre-Meal BG greater than or equal to 450 give 7 units. To be given with prandial insulin, and based on pre-meal blood glucose. Administering  insulin within 5 minutes of the start of the meal is ideal. Administer insulin no more than 30 minutes after the start of the meal, unless directed otherwise by provider.   Notify provider if glucose greater than or equal to 350 mg/dL after administration of correction dose., Disp: 15 mL, Rfl: 3    insulin glargine (LANTUS PEN) 100 UNIT/ML pen, Inject 32 Units Subcutaneous at bedtime, Disp: , Rfl:     linagliptin-metFORMIN (JENTADUETO) 2.5-1000 MG per tablet, Take 1 tablet by mouth 2 times daily (with meals), Disp: , Rfl:     netarsudil (RHOPRESSA) 0.02 % ophthalmic solution, Place 1 drop into both eyes at bedtime, Disp: , Rfl:     polyethylene glycol (MIRALAX) 17 GM/Dose powder, Take 17 g by mouth daily as needed for constipation, Disp: 510 g, Rfl: 0    senna-docusate (SENOKOT-S/PERICOLACE) 8.6-50 MG tablet, Take 1 tablet by mouth 2 times daily, Disp: 30 tablet, Rfl: 1    travoprost BAK FREE (TRAVATAN Z) 0.004 % ophthalmic solution, Place 1 drop into both eyes at bedtime, Disp: , Rfl:       Family history: No family history on file.    Social history:      Allergies:  No Known Allergies    Review of Systems: 5 point ROS performed and negative except for detailed above    Objective:    Constitutional: Awake, no acute distress  HENT: normcephalic,   Respiratory: normal rate, no acute distress  Neuro:  Mental status: awake, oriented   CN: EOMI, face movements symmetric, no dysarthria, hearing intact  Motor: intact strength in upper extremities bilaterally      Imaging Reviewed:      CTA head and neck  There is a moderate left   proximal anterior M2 segment stenosis. There is moderate proximal posterior right M2 segment stenosis. There is mild proximal right M1 segment stenosis. Questionable medially directed 2 mm clinoidal ICA aneurysm (image 298 of series 6. 1.5 mm right   posterior communicating artery infundibulum or aneurysm. Laterally directed contour irregularity of the left distal cavernous ICA (image 302 of  series 6) may represent atherosclerotic plaque or a 1 mm aneurysm.         Assessment:  Donnie Trinidad  is a 63 year old male who presents for incidentally found intracranial aneurysms there are approximately 3 very small aneurysms one is a right 2 mm medially projecting ophthalmic segment artery aneurysm, a 1.5 right posterior communicating artery aneurysm and a small laterally projecting cavernous segment aneurysm on the right. Discussed the natural course of aneurysms the mortality and morbidity with aneurysm rupture. The size of the aneurysm is approximately 2mm given this size and location of the aneurysm this carries a less than 1 % rupture risk over a 5 year time period. Discussed the risk factors for aneurysm rupture and growth including smoking, hypertension that is uncontrolled and family history. The patient does not want to be seen today since he has ongoing insurance issues. We will not charge.       Rosita West MD  Endovascular Surgical Neuroradiology Fellow  [facility]  [phone]    Endovascular Surgical Neuroradiology staff is Dr. Barros    I spoke with the patient , he does not wish to be charged and does not want to do the visit since he has insurance issues. We will cancel this appointment.

## 2024-05-07 NOTE — TELEPHONE ENCOUNTER
LVM informing patient to call his insurance to know if the visit to clinic is cover. Provided clinic phone number is an questions.      Selam Clarke on 5/7/2024 at 1:31 PM

## 2024-05-07 NOTE — LETTER
5/7/2024       RE: Donnie Trinidad  [address]  [place] [address]       Dear Colleague,    Thank you for referring your patient, Donnie Trinidad, to the [facility] [place] [facility]. Please see a copy of my visit note below.    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  He is being evaluated for facial droop and gait abnormality which showed a left JOSE territory infarct on MRI imaging.  He also has multiple intracranial atherosclerotic disease with stenoses, he was placed on dual antiplatelet therapy for 90 days.    Past medical history: No past medical history on file.     Current outpatient Medication list:   Current Outpatient Medications:     aspirin (ASA) 325 MG tablet, Take 1 tablet (325 mg) by mouth daily, Disp: 90 tablet, Rfl: 3    atorvastatin (LIPITOR) 40 MG tablet, Take 1 tablet (40 mg) by mouth every evening, Disp: 30 tablet, Rfl: 2    brimonidine-timolol (COMBIGAN) 0.2-0.5 % ophthalmic solution, Place 1 drop into both eyes 2 times daily, Disp: , Rfl:     clopidogrel (PLAVIX) 75 MG tablet, Take 1 tablet (75 mg) by mouth daily, Disp: 90 tablet, Rfl: 0    dorzolamide (TRUSOPT) 2 % ophthalmic solution, Place 1 drop into both eyes 2 times daily, Disp: , Rfl:     enzalutamide (XTANDI) 40 MG capsule, Take 160 mg by mouth daily, Disp: , Rfl:     insulin aspart (NOVOLOG FLEXPEN) 100 UNIT/ML pen, Correction Scale - MEDIUM INSULIN RESISTANCE DOSING   Do Not give Correction Insulin if Pre-Meal BG less than 140. For Pre-Meal  - 189 give 1 unit. For Pre-Meal  - 239 give 2  units. For Pre-Meal  - 289 give 3 units. For Pre-Meal  - 339 give 4 units. For Pre-Meal - 399 give 5 units. For Pre-Meal -449 give 6 units For Pre-Meal BG greater than or equal to 450 give 7 units. To be given with prandial insulin, and based on pre-meal blood glucose. Administering insulin within 5 minutes of the start of the meal is ideal. Administer insulin no more than 30 minutes after the start of the meal, unless directed otherwise by provider.   Notify provider if glucose greater than or equal to 350 mg/dL after administration of correction dose., Disp: 15 mL, Rfl: 3    insulin glargine (LANTUS PEN) 100 UNIT/ML pen, Inject 32 Units Subcutaneous at bedtime, Disp: , Rfl:     linagliptin-metFORMIN (JENTADUETO) 2.5-1000 MG per tablet, Take 1 tablet by mouth 2 times daily (with meals), Disp: , Rfl:     netarsudil (RHOPRESSA) 0.02 % ophthalmic solution, Place 1 drop into both eyes at bedtime, Disp: , Rfl:     polyethylene glycol (MIRALAX) 17 GM/Dose powder, Take 17 g by mouth daily as needed for constipation, Disp: 510 g, Rfl: 0    senna-docusate (SENOKOT-S/PERICOLACE) 8.6-50 MG tablet, Take 1 tablet by mouth 2 times daily, Disp: 30 tablet, Rfl: 1    travoprost BAK FREE (TRAVATAN Z) 0.004 % ophthalmic solution, Place 1 drop into both eyes at bedtime, Disp: , Rfl:       Family history: No family history on file.    Social history:      Allergies:  No Known Allergies    Review of Systems: 5 point ROS performed and negative except for detailed above    Objective:    Constitutional: Awake, no acute distress  HENT: normcephalic,   Respiratory: normal rate, no acute distress  Neuro:  Mental status: awake, oriented   CN: EOMI, face movements symmetric, no dysarthria, hearing intact  Motor: intact strength in upper extremities bilaterally      Imaging Reviewed:      CTA head and neck  There is a moderate left   proximal anterior M2 segment stenosis. There is moderate proximal posterior right M2  segment stenosis. There is mild proximal right M1 segment stenosis. Questionable medially directed 2 mm clinoidal ICA aneurysm (image 298 of series 6. 1.5 mm right   posterior communicating artery infundibulum or aneurysm. Laterally directed contour irregularity of the left distal cavernous ICA (image 302 of series 6) may represent atherosclerotic plaque or a 1 mm aneurysm.         Assessment:  Donnie Trinidad  is a 63 year old male who presents for incidentally found intracranial aneurysms there are approximately 3 very small aneurysms one is a right 2 mm medially projecting ophthalmic segment artery aneurysm, a 1.5 right posterior communicating artery aneurysm and a small laterally projecting cavernous segment aneurysm on the right. Discussed the natural course of aneurysms the mortality and morbidity with aneurysm rupture. The size of the aneurysm is approximately 2mm given this size and location of the aneurysm this carries a less than 1 % rupture risk over a 5 year time period. Discussed the risk factors for aneurysm rupture and growth including smoking, hypertension that is uncontrolled and family history. The patient does not want to be seen today since he has ongoing insurance issues. We will not charge.       Rosita West MD  Endovascular Surgical Neuroradiology Fellow  [facility]  [phone]    Endovascular Surgical Neuroradiology staff is Dr. Barros    I spoke with the patient , he does not wish to be charged and does not want to do the visit since he has insurance issues. We will cancel this appointment.      Again, thank you for allowing me to participate in the care of your patient.      Sincerely,    Sachin Barros MD

## 2024-05-07 NOTE — NURSING NOTE
Is the patient currently in the state of [place]? YES    Visit mode:TELEPHONE    If the visit is dropped, the patient can be reconnected by: VIDEO VISIT: Text to cell phone:   Telephone Information:   Mobile [phone]       Will anyone else be joining the visit? NO  (If patient encounters technical issues they should call [phone])    How would you like to obtain your AVS? MyChart    Are changes needed to the allergy or medication list? No    Are refills needed on medications prescribed by this physician? NO    Reason for visit: Consult    Dacia REYES

## 2024-05-09 ENCOUNTER — TELEPHONE (OUTPATIENT)
Dept: NEUROSURGERY | Facility: CLINIC | Age: 64
End: 2024-05-09
Payer: COMMERCIAL

## 2024-05-09 NOTE — TELEPHONE ENCOUNTER
Left Voicemail (1st Attempt) for the patient to call back and schedule the following:    Appointment type: New vascular neurosurg - phone  Provider: Dr. Barros  Return date: Next available  Specialty phone number: [phone]  Additional appointment(s) needed: N/A  Additonal Notes: #[phone] (Phone visit)/ Cerebral aneurysm, nonruptured/ Epic/Pacs

## 2024-05-22 ENCOUNTER — TELEPHONE (OUTPATIENT)
Dept: NEUROSURGERY | Facility: CLINIC | Age: 64
End: 2024-05-22
Payer: COMMERCIAL

## 2024-05-22 NOTE — TELEPHONE ENCOUNTER
Left Voicemail (2nd Attempt) for the patient to call back and schedule the following:    Appointment type: New vascular neurosurg - phone  Provider: Dr. Barros  Return date: Next available  Specialty phone number: [phone]  Additional appointment(s) needed: N/A  Additonal Notes: #[phone] (Phone visit)/ Cerebral aneurysm, nonruptured/ Epic/Pacs

## 2024-05-23 ENCOUNTER — PATIENT OUTREACH (OUTPATIENT)
Dept: CARE COORDINATION | Facility: CLINIC | Age: 64
End: 2024-05-23
Payer: COMMERCIAL

## 2024-05-31 NOTE — PROGRESS NOTES
Social Work - Telephone/MyChart message  [facility]  Data:   Patient Name: Donnie Trinidad  Goes By: Donnie ENGLISH/Age: 1960 (63 year old)      Referral Source: Cheri Cowart RN    Reason for Referral: insurance issues    Intervention: Left voice message for patient on 2024 and 2024 .   Plan:  will await patient's return phone call/message and provide assistance at that time.      TYESHA Ramirez, [facility]    [facility]  [address] [place] [id number]    [phone]